# Patient Record
Sex: FEMALE | Race: WHITE | Employment: FULL TIME | URBAN - METROPOLITAN AREA
[De-identification: names, ages, dates, MRNs, and addresses within clinical notes are randomized per-mention and may not be internally consistent; named-entity substitution may affect disease eponyms.]

---

## 2018-04-17 PROBLEM — F51.01 PRIMARY INSOMNIA: Status: ACTIVE | Noted: 2018-04-17

## 2018-04-17 PROBLEM — F33.42 RECURRENT MAJOR DEPRESSIVE DISORDER, IN FULL REMISSION (HCC): Status: ACTIVE | Noted: 2018-04-17

## 2018-08-14 PROBLEM — E03.9 ACQUIRED HYPOTHYROIDISM: Status: ACTIVE | Noted: 2018-08-14

## 2019-06-07 PROBLEM — Z85.828 HISTORY OF BASAL CELL CARCINOMA OF SKIN: Status: ACTIVE | Noted: 2019-06-07

## 2019-06-07 PROBLEM — L56.5 DSAP (DISSEMINATED SUPERFICIAL ACTINIC POROKERATOSIS): Status: ACTIVE | Noted: 2019-06-07

## 2020-01-03 PROBLEM — Z90.5 H/O KIDNEY DONATION: Status: ACTIVE | Noted: 2020-01-03

## 2020-11-11 PROBLEM — J45.20 MILD INTERMITTENT ASTHMA WITHOUT COMPLICATION: Status: ACTIVE | Noted: 2020-11-11

## 2021-05-13 PROBLEM — K43.2 VENTRAL INCISIONAL HERNIA: Status: ACTIVE | Noted: 2021-05-13

## 2021-09-07 ENCOUNTER — HOSPITAL ENCOUNTER (OUTPATIENT)
Dept: PHYSICAL THERAPY | Age: 46
Discharge: HOME | End: 2021-09-07
Attending: SPECIALIST/TECHNOLOGIST

## 2021-09-07 NOTE — PROGRESS NOTES
PT NOTE:    Patient arrived for PT initial evaluation, but left without being seen due to price per visit until meeting deductible.     Adiel Oro, PT, DPT

## 2022-03-18 PROBLEM — Z90.5 H/O KIDNEY DONATION: Status: ACTIVE | Noted: 2020-01-03

## 2022-03-18 PROBLEM — L56.5 DSAP (DISSEMINATED SUPERFICIAL ACTINIC POROKERATOSIS): Status: ACTIVE | Noted: 2019-06-07

## 2022-03-18 PROBLEM — E03.9 ACQUIRED HYPOTHYROIDISM: Status: ACTIVE | Noted: 2018-08-14

## 2022-03-19 PROBLEM — F33.42 RECURRENT MAJOR DEPRESSIVE DISORDER, IN FULL REMISSION (HCC): Status: ACTIVE | Noted: 2018-04-17

## 2022-03-19 PROBLEM — Z85.828 HISTORY OF BASAL CELL CARCINOMA OF SKIN: Status: ACTIVE | Noted: 2019-06-07

## 2022-03-19 PROBLEM — K43.2 VENTRAL INCISIONAL HERNIA: Status: ACTIVE | Noted: 2021-05-13

## 2022-03-19 PROBLEM — J45.20 MILD INTERMITTENT ASTHMA WITHOUT COMPLICATION: Status: ACTIVE | Noted: 2020-11-11

## 2022-03-19 PROBLEM — F51.01 PRIMARY INSOMNIA: Status: ACTIVE | Noted: 2018-04-17

## 2022-03-28 ENCOUNTER — HOSPITAL ENCOUNTER (OUTPATIENT)
Dept: MAMMOGRAPHY | Age: 47
Discharge: HOME | End: 2022-03-28
Payer: COMMERCIAL

## 2022-03-28 DIAGNOSIS — Z01.419 WELL WOMAN EXAM: ICD-10-CM

## 2022-03-28 DIAGNOSIS — R92.2 DENSE BREAST: ICD-10-CM

## 2022-03-28 DIAGNOSIS — Z12.31 ENCOUNTER FOR SCREENING MAMMOGRAM FOR MALIGNANT NEOPLASM OF BREAST: ICD-10-CM

## 2022-03-28 PROCEDURE — 77063 BREAST TOMOSYNTHESIS BI: CPT

## 2022-05-27 RX ORDER — ANTIARTHRITIC COMBINATION NO.2 900 MG
1 TABLET ORAL DAILY
COMMUNITY

## 2022-05-27 NOTE — PERIOP NOTE
Patient verified name and . Order for consent not found in EHR and matches case posting; patient verifies procedure. Type 1B surgery, phone assessment complete. Orders not received. Labs per surgeon: none  Labs per anesthesia protocol: none    Patient answered medical/surgical history questions at their best of ability. All prior to admission medications documented in [facility]. Patient instructed to take the following medications the day of surgery according to anesthesia guidelines with a small sip of water: Albuterol sulftate HFA, use if needed and bring with you, bupropion (Wellbutrin),  Loratadine (Claritin), Acyclovir (Zovirax) if needed, Flonase, Levothyroxine (Synthroid) On the day before surgery please take Acetaminophen 1000mg in the morning and then again before bed. You may substitute for Tylenol 650 mg. Hold all vitamins 7 days prior to surgery and NSAIDS 5 days prior to surgery. Prescription meds to hold:none  Patient instructed on the following:    > Arrive at [facility] OP Entrance, time of arrival to be called the day before by 1700  > NPO after midnight including gum, mints, and ice chips  > Responsible adult must drive patient to the hospital, stay during surgery, and patient will need supervision 24 hours after anesthesia  > Use antibacterial soap  in shower the night before surgery and on the morning of surgery  > All piercings must be removed prior to arrival.    > Leave all valuables (money and jewelry) at home but bring insurance card and ID on DOS.   > You may be required to pay a deductible or co-pay on the day of your procedure. You can pre-pay by calling [phone] if your surgery is at the [facility] or [phone] if your surgery is at the [facility]. > Do not wear make-up, nail polish, lotions, cologne, perfumes, powders, or oil on skin. Artificial nails are not permitted.

## 2022-05-31 ENCOUNTER — ANESTHESIA EVENT (OUTPATIENT)
Dept: SURGERY | Age: 47
End: 2022-05-31
Payer: COMMERCIAL

## 2022-05-31 ENCOUNTER — OFFICE VISIT (OUTPATIENT)
Dept: ORTHOPEDIC SURGERY | Age: 47
End: 2022-05-31

## 2022-05-31 DIAGNOSIS — E03.9 ACQUIRED HYPOTHYROIDISM: Primary | ICD-10-CM

## 2022-05-31 DIAGNOSIS — M25.373 UNSTABLE ANKLE, UNSPECIFIED LATERALITY: Primary | ICD-10-CM

## 2022-05-31 DIAGNOSIS — M25.572 ACUTE LEFT ANKLE PAIN: ICD-10-CM

## 2022-05-31 RX ORDER — DOCUSATE SODIUM 100 MG/1
100 CAPSULE, LIQUID FILLED ORAL DAILY PRN
Qty: 30 CAPSULE | Refills: 0 | Status: SHIPPED | OUTPATIENT
Start: 2022-05-31 | End: 2022-07-08 | Stop reason: ALTCHOICE

## 2022-05-31 RX ORDER — ASPIRIN 81 MG/1
81 TABLET ORAL 2 TIMES DAILY
Qty: 21 TABLET | Refills: 0 | Status: SHIPPED | OUTPATIENT
Start: 2022-05-31 | End: 2022-07-08 | Stop reason: ALTCHOICE

## 2022-05-31 RX ORDER — OXYCODONE HYDROCHLORIDE AND ACETAMINOPHEN 5; 325 MG/1; MG/1
1 TABLET ORAL EVERY 6 HOURS PRN
Qty: 30 TABLET | Refills: 0 | Status: SHIPPED | OUTPATIENT
Start: 2022-05-31 | End: 2022-06-05

## 2022-05-31 RX ORDER — ONDANSETRON 4 MG/1
4 TABLET, ORALLY DISINTEGRATING ORAL EVERY 8 HOURS PRN
Qty: 20 TABLET | Refills: 0 | Status: SHIPPED | OUTPATIENT
Start: 2022-05-31 | End: 2022-07-08 | Stop reason: ALTCHOICE

## 2022-05-31 NOTE — PROGRESS NOTES
Name: Lauri Ross   YOB: 1975   Gender: female   MRN: [id number]       Summary: right ankle sprain early February 2022. Continued pain. MRI shows peroneal tendon tearing and tenosynovitis, and ankle effusion. -Proceed with right lateral ankle reconstruction, peroneal tendon repair and ankle arthroscopy. We discussed today that we may not perform the lateral ankle reconstruction if she is stable on the operating room table. Donated a kidney so cannot take nonsteroidals. Popliteal saphenous block        CC: right ankle pain      HPI: Lauri Ross is a  55 y.o. female Presents today with  right ankle pain that began after they twisted/rolled it 6 weeks ago on a track. This was an inversion ankle injury. They point directly over the lateral ankle into the lateral foot when describing  the pain. They describe pain as deep ache and sharp stabbing pain around the ankle. It is aggravated by standing, at the end of the day, twisting, direct pressure and movement of the ankle. It is alleviated by rest and ice but the pain doesn't completely  leave. They do not have a history of chronic ankle rolling and sprains. She did not do well initially with booting. When I saw her a week transition her into an ASO brace near [facility]. She initially did well but then does develop some increasing pain and what she describes instability. She states her ankle feels it is giving  way and she has pain on the outside of her ankle and some on the inside. We ordered an MRI and she presents back today to discuss the MRI. Based on the MRI we attempted cortisone injection and physical therapy. She still has recurrent ankle instability episodes. Her pain is still limiting her. It did help some but she is not better.   She wants to discuss surgery today as she is thinks  that she has not improved to the point she wants and she still has episodes of ankle rolling and giving way on right ankle on 3/25/2022 at [facility]   Tearing of the peroneal brevis with some straining of the peroneal longus. Injuries of the ATFL and CFL with edema noted. Mild bone contusion of the medial malleolus and posterior talar dome. Ankle effusion noted. Assessment:    right Ankle Sprain   Right peroneal tendon issues      Plan:    4 This is an acute complicated injury. Treatment at this time: Elective major surgery with procedural risk factors    Physical therapy was also ordered today. Weight-bearing status: WBAT in Ankle Brace         Studies ordered: none   Return to work/work restrictions: none   none      Today we discussed surgery. I discussed ankle scope, right ankle reconstruction, peroneal tendon surgery. I discussed surgery with this patient and the risk associated with surgery. These risk include infection, delayed wound healing, hardware failure, painful hardware, recurring wounds, recurring pain, damage to surrounding structures such as nerves, vessels, tendons, ligaments, and joints. I discussed how no surgery is perfect  and this surgery may not be successful. There is also risk of anesthesia such as nerve damage from local anesthetic, damage to the airway or mouth structures, respiratory distress, cardiac disease exacerbation, potential arrhythmias, and even death. The patient verbalized understanding of each of these risk and elected to proceed with surgery. I also discussed chronic nerve issues such as numbness and tingling, retearing, revision surgery, and possibly stiffness associated with the surgery. I discussed how the ankle reconstruction does change postoperative recovery protocols. I also brought for pain along recovery. Her exam does show some mild translation and she does describe instability that is recurrent.   However I want make sure on the operating room table that she truly has increased laxity on stress exam.  Based on intraoperative exam we will decide whether or not she needs a lateral ankle reconstruction or not.   She understands this

## 2022-05-31 NOTE — H&P (VIEW-ONLY)
Name: Juancho Warren   YOB: 1975   Gender: female   MRN: [id number]       Summary: right ankle sprain early February 2022. Continued pain. MRI shows peroneal tendon tearing and tenosynovitis, and ankle effusion. -Proceed with right lateral ankle reconstruction, peroneal tendon repair and ankle arthroscopy. We discussed today that we may not perform the lateral ankle reconstruction if she is stable on the operating room table. Donated a kidney so cannot take nonsteroidals. Popliteal saphenous block        CC: right ankle pain      HPI: Juancho Warren is a  55 y.o. female Presents today with  right ankle pain that began after they twisted/rolled it 6 weeks ago on a track. This was an inversion ankle injury. They point directly over the lateral ankle into the lateral foot when describing  the pain. They describe pain as deep ache and sharp stabbing pain around the ankle. It is aggravated by standing, at the end of the day, twisting, direct pressure and movement of the ankle. It is alleviated by rest and ice but the pain doesn't completely  leave. They do not have a history of chronic ankle rolling and sprains. She did not do well initially with booting. When I saw her a week transition her into an ASO brace near [facility]. She initially did well but then does develop some increasing pain and what she describes instability. She states her ankle feels it is giving  way and she has pain on the outside of her ankle and some on the inside. We ordered an MRI and she presents back today to discuss the MRI. Based on the MRI we attempted cortisone injection and physical therapy. She still has recurrent ankle instability episodes. Her pain is still limiting her. It did help some but she is not better.   She wants to discuss surgery today as she is thinks  that she has not improved to the point she wants and she still has episodes of ankle rolling and giving way on her.    Today is for preop appointment. She still states she has instability episodes. ROS/Meds/PSH/PMH/FH/SH: I personally reviewed the patients standard intake form. Below are the pertinents      Tobacco:  reports that she has never smoked. She has never used smokeless tobacco.   Diabetes: None   Other: none      Physical Examination:   right lower: 2+ DP. Toes wwp x5 w BCR. EHLFHLEDLFDL intact but strength diminished due to ankle pain. Achilles intact. There is some ecchymosis & edema laterally about the ankle.  + SILT ssspdpt  nerves. TTP at the distal fibula and Anterior Lateral ankle, extending into the lateral CC joint region. Today her main pain is located over the peroneal tendons starting the fibula and working down to the cuboid groove. This is not the lateral ankle  ligamentous complex, rather the peroneal tendons themselves. Minimally TTP at the medial ankle. No TTP at the proximal fibula or at the forefoot. Specifically none at the [facility] joint. They are having some mild SPN sensitivity. Regarding her ankles  stability there is some mild laxity to anterior drawer exam      Imaging:    Review of prior x-rays    X-Ray RIGHT Ankle 3 vw (AP/Lateral/Oblique) for ankle pain       Findings: No signs of fracture or dislocations. The tibiotalar and subtalar joints are aligned,  there are no signs of neoplastic disease, or chronic disease or arthritis. The fibula length is maintained with no signs of syndesmosis injury, medial clear space widening, or ligamentous disease. Impression:  Normal Ankle       Signature: Sameer Sidhu MD      and     X-Ray RIGHT Foot 2 vw (AP/Oblique) for foot pain       Findings: No signs of fracture or dislocations. The TMT joints are aligned, there are no signs  of neoplastic disease, or chronic disease.   The midtarsal and subtalar joints appear normal.       Impression:  Normal foot       Signature: Sameer Sidhu MD         MRI of the right ankle on 3/25/2022 at [facility]   Tearing of the peroneal brevis with some straining of the peroneal longus. Injuries of the ATFL and CFL with edema noted. Mild bone contusion of the medial malleolus and posterior talar dome. Ankle effusion noted. Assessment:    right Ankle Sprain   Right peroneal tendon issues      Plan:    4 This is an acute complicated injury. Treatment at this time: Elective major surgery with procedural risk factors    Physical therapy was also ordered today. Weight-bearing status: WBAT in Ankle Brace         Studies ordered: none   Return to work/work restrictions: none   none      Today we discussed surgery. I discussed ankle scope, right ankle reconstruction, peroneal tendon surgery. I discussed surgery with this patient and the risk associated with surgery. These risk include infection, delayed wound healing, hardware failure, painful hardware, recurring wounds, recurring pain, damage to surrounding structures such as nerves, vessels, tendons, ligaments, and joints. I discussed how no surgery is perfect  and this surgery may not be successful. There is also risk of anesthesia such as nerve damage from local anesthetic, damage to the airway or mouth structures, respiratory distress, cardiac disease exacerbation, potential arrhythmias, and even death. The patient verbalized understanding of each of these risk and elected to proceed with surgery. I also discussed chronic nerve issues such as numbness and tingling, retearing, revision surgery, and possibly stiffness associated with the surgery. I discussed how the ankle reconstruction does change postoperative recovery protocols. I also brought for pain along recovery. Her exam does show some mild translation and she does describe instability that is recurrent.   However I want make sure on the operating room table that she truly has increased laxity on stress exam.  Based on intraoperative exam we will decide whether or not she needs a lateral ankle reconstruction or not.   She understands this

## 2022-06-01 ENCOUNTER — ANESTHESIA (OUTPATIENT)
Dept: SURGERY | Age: 47
End: 2022-06-01
Payer: COMMERCIAL

## 2022-06-01 ENCOUNTER — HOSPITAL ENCOUNTER (OUTPATIENT)
Age: 47
Setting detail: OUTPATIENT SURGERY
Discharge: HOME | End: 2022-06-01
Attending: ORTHOPAEDIC SURGERY | Admitting: ORTHOPAEDIC SURGERY
Payer: COMMERCIAL

## 2022-06-01 VITALS
DIASTOLIC BLOOD PRESSURE: 65 MMHG | SYSTOLIC BLOOD PRESSURE: 113 MMHG | HEIGHT: 67 IN | RESPIRATION RATE: 17 BRPM | BODY MASS INDEX: 29.82 KG/M2 | TEMPERATURE: 98 F | HEART RATE: 75 BPM | WEIGHT: 190 LBS | OXYGEN SATURATION: 93 %

## 2022-06-01 PROCEDURE — 6360000002 HC RX W HCPCS: Performed by: STUDENT IN AN ORGANIZED HEALTH CARE EDUCATION/TRAINING PROGRAM

## 2022-06-01 PROCEDURE — 27698 REPAIR OF ANKLE LIGAMENT: CPT | Performed by: ORTHOPAEDIC SURGERY

## 2022-06-01 PROCEDURE — 3600000014 HC SURGERY LEVEL 4 ADDTL 15MIN: Performed by: ORTHOPAEDIC SURGERY

## 2022-06-01 PROCEDURE — 64447 NJX AA&/STRD FEMORAL NRV IMG: CPT | Performed by: STUDENT IN AN ORGANIZED HEALTH CARE EDUCATION/TRAINING PROGRAM

## 2022-06-01 PROCEDURE — 6360000002 HC RX W HCPCS: Performed by: NURSE ANESTHETIST, CERTIFIED REGISTERED

## 2022-06-01 PROCEDURE — 3700000000 HC ANESTHESIA ATTENDED CARE: Performed by: ORTHOPAEDIC SURGERY

## 2022-06-01 PROCEDURE — 6360000002 HC RX W HCPCS: Performed by: ORTHOPAEDIC SURGERY

## 2022-06-01 PROCEDURE — 27680 RELEASE OF LOWER LEG TENDON: CPT | Performed by: ORTHOPAEDIC SURGERY

## 2022-06-01 PROCEDURE — C1713 ANCHOR/SCREW BN/BN,TIS/BN: HCPCS | Performed by: ORTHOPAEDIC SURGERY

## 2022-06-01 PROCEDURE — 7100000011 HC PHASE II RECOVERY - ADDTL 15 MIN: Performed by: ORTHOPAEDIC SURGERY

## 2022-06-01 PROCEDURE — 2500000003 HC RX 250 WO HCPCS: Performed by: NURSE ANESTHETIST, CERTIFIED REGISTERED

## 2022-06-01 PROCEDURE — 7100000010 HC PHASE II RECOVERY - FIRST 15 MIN: Performed by: ORTHOPAEDIC SURGERY

## 2022-06-01 PROCEDURE — 29891 ARTHR ANK OSTCHN DF TAL&/TIB: CPT | Performed by: ORTHOPAEDIC SURGERY

## 2022-06-01 PROCEDURE — 2580000003 HC RX 258: Performed by: STUDENT IN AN ORGANIZED HEALTH CARE EDUCATION/TRAINING PROGRAM

## 2022-06-01 PROCEDURE — 2720000010 HC SURG SUPPLY STERILE: Performed by: ORTHOPAEDIC SURGERY

## 2022-06-01 PROCEDURE — 7100000000 HC PACU RECOVERY - FIRST 15 MIN: Performed by: ORTHOPAEDIC SURGERY

## 2022-06-01 PROCEDURE — 2709999900 HC NON-CHARGEABLE SUPPLY: Performed by: ORTHOPAEDIC SURGERY

## 2022-06-01 PROCEDURE — 64445 NJX AA&/STRD SCIATIC NRV IMG: CPT | Performed by: STUDENT IN AN ORGANIZED HEALTH CARE EDUCATION/TRAINING PROGRAM

## 2022-06-01 PROCEDURE — 3700000001 HC ADD 15 MINUTES (ANESTHESIA): Performed by: ORTHOPAEDIC SURGERY

## 2022-06-01 PROCEDURE — 3600000004 HC SURGERY LEVEL 4 BASE: Performed by: ORTHOPAEDIC SURGERY

## 2022-06-01 PROCEDURE — 7100000001 HC PACU RECOVERY - ADDTL 15 MIN: Performed by: ORTHOPAEDIC SURGERY

## 2022-06-01 DEVICE — 1.8MM Q-FIX ALL SUTURE ANCHOR
Type: IMPLANTABLE DEVICE | Site: ANKLE | Status: FUNCTIONAL
Brand: Q-FIX

## 2022-06-01 RX ORDER — OXYCODONE HYDROCHLORIDE 5 MG/1
10 TABLET ORAL PRN
Status: DISCONTINUED | OUTPATIENT
Start: 2022-06-01 | End: 2022-06-01 | Stop reason: HOSPADM

## 2022-06-01 RX ORDER — FENTANYL CITRATE 50 UG/ML
100 INJECTION, SOLUTION INTRAMUSCULAR; INTRAVENOUS
Status: COMPLETED | OUTPATIENT
Start: 2022-06-01 | End: 2022-06-01

## 2022-06-01 RX ORDER — SODIUM CHLORIDE 9 MG/ML
INJECTION, SOLUTION INTRAVENOUS PRN
Status: DISCONTINUED | OUTPATIENT
Start: 2022-06-01 | End: 2022-06-01 | Stop reason: HOSPADM

## 2022-06-01 RX ORDER — PROPOFOL 10 MG/ML
INJECTION, EMULSION INTRAVENOUS PRN
Status: DISCONTINUED | OUTPATIENT
Start: 2022-06-01 | End: 2022-06-01 | Stop reason: SDUPTHER

## 2022-06-01 RX ORDER — DIPHENHYDRAMINE HYDROCHLORIDE 50 MG/ML
12.5 INJECTION INTRAMUSCULAR; INTRAVENOUS
Status: DISCONTINUED | OUTPATIENT
Start: 2022-06-01 | End: 2022-06-01 | Stop reason: HOSPADM

## 2022-06-01 RX ORDER — OXYCODONE HYDROCHLORIDE 5 MG/1
5 TABLET ORAL PRN
Status: DISCONTINUED | OUTPATIENT
Start: 2022-06-01 | End: 2022-06-01 | Stop reason: HOSPADM

## 2022-06-01 RX ORDER — ROPIVACAINE HYDROCHLORIDE 5 MG/ML
INJECTION, SOLUTION EPIDURAL; INFILTRATION; PERINEURAL
Status: COMPLETED | OUTPATIENT
Start: 2022-06-01 | End: 2022-06-01

## 2022-06-01 RX ORDER — MIDAZOLAM HYDROCHLORIDE 2 MG/2ML
2 INJECTION, SOLUTION INTRAMUSCULAR; INTRAVENOUS
Status: COMPLETED | OUTPATIENT
Start: 2022-06-01 | End: 2022-06-01

## 2022-06-01 RX ORDER — LABETALOL HYDROCHLORIDE 5 MG/ML
10 INJECTION, SOLUTION INTRAVENOUS
Status: DISCONTINUED | OUTPATIENT
Start: 2022-06-01 | End: 2022-06-01 | Stop reason: HOSPADM

## 2022-06-01 RX ORDER — ONDANSETRON 2 MG/ML
INJECTION INTRAMUSCULAR; INTRAVENOUS PRN
Status: DISCONTINUED | OUTPATIENT
Start: 2022-06-01 | End: 2022-06-01 | Stop reason: SDUPTHER

## 2022-06-01 RX ORDER — HYDROMORPHONE HYDROCHLORIDE 2 MG/ML
0.5 INJECTION, SOLUTION INTRAMUSCULAR; INTRAVENOUS; SUBCUTANEOUS EVERY 5 MIN PRN
Status: DISCONTINUED | OUTPATIENT
Start: 2022-06-01 | End: 2022-06-01 | Stop reason: HOSPADM

## 2022-06-01 RX ORDER — HALOPERIDOL 5 MG/ML
1 INJECTION INTRAMUSCULAR
Status: DISCONTINUED | OUTPATIENT
Start: 2022-06-01 | End: 2022-06-01 | Stop reason: HOSPADM

## 2022-06-01 RX ORDER — HYDRALAZINE HYDROCHLORIDE 20 MG/ML
10 INJECTION INTRAMUSCULAR; INTRAVENOUS
Status: DISCONTINUED | OUTPATIENT
Start: 2022-06-01 | End: 2022-06-01 | Stop reason: HOSPADM

## 2022-06-01 RX ORDER — LIDOCAINE HYDROCHLORIDE 20 MG/ML
INJECTION, SOLUTION EPIDURAL; INFILTRATION; INTRACAUDAL; PERINEURAL PRN
Status: DISCONTINUED | OUTPATIENT
Start: 2022-06-01 | End: 2022-06-01 | Stop reason: SDUPTHER

## 2022-06-01 RX ORDER — SODIUM CHLORIDE 0.9 % (FLUSH) 0.9 %
5-40 SYRINGE (ML) INJECTION EVERY 12 HOURS SCHEDULED
Status: DISCONTINUED | OUTPATIENT
Start: 2022-06-01 | End: 2022-06-01 | Stop reason: HOSPADM

## 2022-06-01 RX ORDER — PROPOFOL 10 MG/ML
INJECTION, EMULSION INTRAVENOUS CONTINUOUS PRN
Status: DISCONTINUED | OUTPATIENT
Start: 2022-06-01 | End: 2022-06-01 | Stop reason: SDUPTHER

## 2022-06-01 RX ORDER — SODIUM CHLORIDE, SODIUM LACTATE, POTASSIUM CHLORIDE, CALCIUM CHLORIDE 600; 310; 30; 20 MG/100ML; MG/100ML; MG/100ML; MG/100ML
INJECTION, SOLUTION INTRAVENOUS CONTINUOUS
Status: DISCONTINUED | OUTPATIENT
Start: 2022-06-01 | End: 2022-06-01 | Stop reason: HOSPADM

## 2022-06-01 RX ORDER — DEXAMETHASONE SODIUM PHOSPHATE 4 MG/ML
INJECTION, SOLUTION INTRA-ARTICULAR; INTRALESIONAL; INTRAMUSCULAR; INTRAVENOUS; SOFT TISSUE PRN
Status: DISCONTINUED | OUTPATIENT
Start: 2022-06-01 | End: 2022-06-01 | Stop reason: SDUPTHER

## 2022-06-01 RX ORDER — FENTANYL CITRATE 50 UG/ML
25 INJECTION, SOLUTION INTRAMUSCULAR; INTRAVENOUS
Status: COMPLETED | OUTPATIENT
Start: 2022-06-01 | End: 2022-06-01

## 2022-06-01 RX ORDER — PROCHLORPERAZINE EDISYLATE 5 MG/ML
5 INJECTION INTRAMUSCULAR; INTRAVENOUS
Status: DISCONTINUED | OUTPATIENT
Start: 2022-06-01 | End: 2022-06-01 | Stop reason: HOSPADM

## 2022-06-01 RX ORDER — IPRATROPIUM BROMIDE AND ALBUTEROL SULFATE 2.5; .5 MG/3ML; MG/3ML
1 SOLUTION RESPIRATORY (INHALATION)
Status: DISCONTINUED | OUTPATIENT
Start: 2022-06-01 | End: 2022-06-01 | Stop reason: HOSPADM

## 2022-06-01 RX ORDER — SODIUM CHLORIDE 0.9 % (FLUSH) 0.9 %
5-40 SYRINGE (ML) INJECTION PRN
Status: DISCONTINUED | OUTPATIENT
Start: 2022-06-01 | End: 2022-06-01 | Stop reason: HOSPADM

## 2022-06-01 RX ADMIN — Medication 2 G: at 13:10

## 2022-06-01 RX ADMIN — ONDANSETRON 4 MG: 2 INJECTION INTRAMUSCULAR; INTRAVENOUS at 13:31

## 2022-06-01 RX ADMIN — LIDOCAINE HYDROCHLORIDE 40 MG: 20 INJECTION, SOLUTION EPIDURAL; INFILTRATION; INTRACAUDAL; PERINEURAL at 13:12

## 2022-06-01 RX ADMIN — PROPOFOL 20 MG: 10 INJECTION, EMULSION INTRAVENOUS at 13:12

## 2022-06-01 RX ADMIN — MIDAZOLAM HYDROCHLORIDE 2 MG: 1 INJECTION, SOLUTION INTRAMUSCULAR; INTRAVENOUS at 12:13

## 2022-06-01 RX ADMIN — ROPIVACAINE HYDROCHLORIDE 20 ML: 5 INJECTION, SOLUTION EPIDURAL; INFILTRATION; PERINEURAL at 12:13

## 2022-06-01 RX ADMIN — SODIUM CHLORIDE, SODIUM LACTATE, POTASSIUM CHLORIDE, AND CALCIUM CHLORIDE: 600; 310; 30; 20 INJECTION, SOLUTION INTRAVENOUS at 10:15

## 2022-06-01 RX ADMIN — ROPIVACAINE HYDROCHLORIDE 20 ML: 5 INJECTION, SOLUTION EPIDURAL; INFILTRATION; PERINEURAL at 12:18

## 2022-06-01 RX ADMIN — FENTANYL CITRATE 100 MCG: 50 INJECTION, SOLUTION INTRAMUSCULAR; INTRAVENOUS at 12:13

## 2022-06-01 RX ADMIN — DEXAMETHASONE SODIUM PHOSPHATE 4 MG: 4 INJECTION, SOLUTION INTRAMUSCULAR; INTRAVENOUS at 13:31

## 2022-06-01 RX ADMIN — PROPOFOL 160 MCG/KG/MIN: 10 INJECTION, EMULSION INTRAVENOUS at 13:13

## 2022-06-01 ASSESSMENT — PAIN SCALES - GENERAL
PAINLEVEL_OUTOF10: 0

## 2022-06-01 ASSESSMENT — PAIN - FUNCTIONAL ASSESSMENT: PAIN_FUNCTIONAL_ASSESSMENT: 0-10

## 2022-06-01 NOTE — ANESTHESIA POSTPROCEDURE EVALUATION
Department of Anesthesiology  Postprocedure Note    Patient: Shakeel Cain  MRN: [id number]  YOB: 1975  Date of evaluation: 6/1/2022  Time:  4:54 PM     Procedure Summary     Date: 06/01/22 Room / Location: [facility] OP OR 02 / [facility]    Anesthesia Start: 1305 Anesthesia Stop: 1448    Procedure: RIGHT ANKLE ARTHROSCOPY WITH RECONSTRUCTION (Right Ankle) Diagnosis:       Instability of right ankle joint      (Instability of right ankle joint [M25.371])    Surgeons: Alcon Gramajo MD Responsible Provider: Hilary Santillan MD    Anesthesia Type: TIVA ASA Status: 2          Anesthesia Type: No value filed. Celine Phase I: Celine Score: 8    Celine Phase II: Celine Score: 10    Last vitals: Reviewed and per EMR flowsheets.        Anesthesia Post Evaluation    Patient location during evaluation: bedside  Patient participation: complete - patient participated  Level of consciousness: awake and alert  Pain score: 1  Airway patency: patent  Nausea & Vomiting: no vomiting  Complications: no  Cardiovascular status: hemodynamically stable  Respiratory status: acceptable  Hydration status: euvolemic

## 2022-06-01 NOTE — INTERVAL H&P NOTE
Update History & Physical    The Patient's History and Physical was reviewed   I discussed the surgery and patients medical condition with the patient. The chart was reviewed with the patient and I examined the patient. There was no change. The surgical site was confirmed by the patient and me. CV: RRR  RESP: CTAB    Plan:  The risk, benefits, expected outcome, and alternative to the recommended procedure have been discussed with the patient. Patient understands and wants to proceed with the procedure.     Electronically signed by Ajay Corley MD on 06/01/22 11:02 AM

## 2022-06-01 NOTE — ANESTHESIA PROCEDURE NOTES
Peripheral Block    Patient location during procedure: pre-op  Start time: 6/1/2022 12:13 PM  End time: 6/1/2022 12:17 PM  Staffing  Performed: anesthesiologist   Anesthesiologist: Aamir Mark MD  Preanesthetic Checklist  Completed: patient identified, IV checked, site marked, risks and benefits discussed, surgical/procedural consents, equipment checked, pre-op evaluation, timeout performed, anesthesia consent given, oxygen available and monitors applied/VS acknowledged  Peripheral Block  Patient position: supine  Prep: ChloraPrep  Patient monitoring: cardiac monitor, continuous pulse ox, frequent blood pressure checks, IV access, oxygen and responsive to questions  Block type: Sciatic  Laterality: right  Injection technique: single-shot  Guidance: ultrasound guided  Popliteal  Provider prep: mask  Needle  Needle type: insulated echogenic nerve stimulator needle   Needle gauge: 20 G  Needle localization: ultrasound guidance  Assessment  Injection assessment: negative aspiration for heme, no paresthesia on injection, local visualized surrounding nerve on ultrasound and no intravascular symptoms  Slow fractionated injection: yes  Hemodynamics: stablepermanent images obtainedOutcomes: patient tolerated procedure well  Medications Administered  Ropivacaine (NAROPIN) injection 0.5%, 20 mL  Reason for block: procedure for pain, post-op pain management and at surgeon's request

## 2022-06-01 NOTE — OP NOTE
Operative Note    Patient:Arielle Rothman  MRN: [id number]    Date Of Surgery: 6/1/2022    Surgeon: Adán Howell MD    Assistant Surgeon: None    Procedure Performed:   right  Lateral ankle reconstruction - ATFL & CFL  Ankle arthroscopy for osteochondral lesion of the talus  Peroneal tendon tenosynovectomy    Pre Op Diagnosis:  Ankle instability  Chondral damage of the talus  Peroneal tendinitis    Post Op Diagnosis:   same    Implants:   Implant Name Type Inv. Item Serial No.  Lot No. LRB No. Used Action   ANCHOR SUT DIA1. 8MM FOR ACET LABRAL REP Q-FIX - [id number]  ANCHOR SUT DIA1. 8MM FOR ACET LABRAL REP Q-FIX  [facility] AND [employer] [place] [id number] Right 2 Implanted       Anesthesia:  Monitor Anesthesia Care    Blood Loss:  -    Tourniquet:  Estimated calf @250 mmHg mmHg -45 minutes    Complications:  -    Pre Operative Abx:   Ancef 2g    Specimens/Cultures:  -    Significant Findings:  1-peroneal tendinitis but no tears of the longus or brevis noted. There was some thinning of the brevis but no tearing  2-ankle instability with positive anterior lateral drawer exam  3-large chondral fissuring off the posterior lateral talus. Note fragments of the chondral pieces were not loose, but this cartilage shelf did extend over the talar body. I did not find it appropriate to debride for this would have not encompass approximately the entire lateral edge of the talus. Pre Operative Course:  Charla Hart is a 55 y.o. female who sustained an acute injury approximately 4 to 5 months ago. She continued to have pain despite nonoperative treatment. MRI was obtained that showed ankle effusion, potential osteochondral lesion, peroneal tearing and my exam was consistent with ligamentous laxity. She continued to have ankle instability. Operation In Detail:  Patient was evaluated in the preoperative area. The right lower extremity was marked by me.   We had a long discussion about the procedure and postoperative protocols. The patient was then brought back to the operating room suite and placed in the operating room table. A timeout was taken to identify the patient, procedure being performed, and laterality. After this the patient was prepped and draped in the normal sterile fashion using a Betadine solution and/or a ChloraPrep solution. A timeout was then taken to identify the patient his name, date of birth, laterality, and procedure being performed. We also identified allergies and any concerns about the operation. Attention was then placed to the operative extremity. No ankle distractor was used for this procedure. I started to perform an anterior lateral ankle exam.  I felt the ankle did translate abnormally in the anterior drawer test compared to the contralateral side. Next I identified the anterior tibial tendon. At the level of the ankle joint, just medial to the anterior tibial tendon, I inserted a a 18-gauge needle and injected 10 cc of normal saline. Then using a 15 blade knife and made a small incision over the skin only. Using the nick and spread technique I then grabbed a hemostat and bluntly dissected down to the joint capsule. I then penetrated the capsule with the hemostat and a gush of fluid was identified. I then inserted the blunt tipped trochar into the joint, followed by the scope. I inspected the joint but felt there was too much hazy material so I next made a lateral portal.  While visualizing with the scope within the ankle joint, I inserted an 18 gauge spinal needle in the anterior lateral ankle. This was done by transillumination technique to avoid neural or vascular structures. I did not identify any vascular sutures. I watched this needle enter the joint at the level of the joint and decided that it was placed well. I then inspected the skin around the 18 gauge needle. A branch of the SPN nerve was identified just lateral to needle.    I made a small nick in the skin with a 15 blade knife medial to the identified SPN branch. Again using the nick and spread technique, dissected down to the joint capsule with a hemostat. As I inserted the hemostat and the capsule I visualized it on arthroscopic cameras. A rush of fluid was seen out of this hole at the skin, and I made sure not to damage any of the chondral surface. Through this portal I inserted a probe. There was a couple plica's and some scar tissue noted in the anterior aspect of the ankle extending into the back of the ankle. I debrided these with a shaver. Medially there is no signs of chondral damage. No signs of tibiotalar damage. There is a couple areas of hypertrophic synovium which I debrided. No signs of loose bodies in the posterior ankle. The anterior ankle and anterior lateral ankle appeared normal.  However I detected the anterior middle in the anterior posterior shoulder the lateral talar dome there is seem to be some loss of normal chondral contour. I stuck my scope into the lateral gutter. There is a significant chondral fissure in this lateral gutter at the talus. This chondral fissure was full-thickness and there was a little loose segment. It spanned approximately the posterior 40% of the talus and extended over the talar dome. I used a [place] and shave down some of the loose edges. However I did not completely do remove this as it was attached to bone underneath it. I did not think had a capability of truly fixing a chondral lesion of this size. Therefore the decision was made to leave the chondral lesion as it is just from some of the loose edges at this large chondral area. During this evaluation of the chondral damage also performed an anterior lateral drawer exam and anterior drawer exam.  I felt the ankle translated slightly too much and close noted to be loose more loose on the lateral side that it was the medial side.   I then withdrew the arthroscopic utensils and removed the fluid from the joint. The operative leg was identified. Using a 15 blade I cute through the skin on the posterior 1/3 of the fibula, extending distally over the course of the peroneal tendons. I sharply dissected down to the level of the lateral ankle ligaments and the peroneal tendon sheath. I incised the peroneal tendon sheath behind the distal fibula and inspected both tendons. The Peroneal Longus tendon was first identified and it had 0 tears. . There was a fair amount of scar tissue and inflamed tenosynovium around it. Using the scissors and a knife I removed the inflamed tenosynovium and performed a tenosynovectomy    The Peroneal Brevis tendon was first identified and it had none tears. There was some thinning and a little full of the tendon however this was not a tear. I did trim some of the loose edges elevated the fraying. There was a low-lying muscle belly which I removed with scissors and a knife, but there was very minimal inflamed tenosynovium around it. I then protected both peroneal tendons with a retractor. I took a 15 blade knife and sharply dissected the Lateral ankle ligaments (ATFL and CFL) off the fibula in one full thickness cuff. I did this by subperiosteally dissecting them off the anterior inferior aspect of the fibula. I made sure I was able to see the lateral talar dome to make sure I got a full thickness cuff. I then took off the soft tissue from the distal fibula anteriorly and inferiorly. This is were the ATFL and CFL insert. I also took a rongeur and made a small trough through the cortical bone in this same area. This exposed cancellous bone. I next identified where the ATFL and CFL both wound insert and drilled a hole in the distal fibula for each. I then inserted the 1Q anchor [employer] anchor into each anatomic footprint for the ATFL and CFL. I attached a free needle to each suture. Each [place] had 1 sutures.   Each end of the suture had a needle. I then Identified the CFL in the inferior tissue cuff. I then passed a suture from the \"CFL fibular insertion\" anchor through the CFL in a grasping fashion. I then passed a second suture through the CFL area, but this was not grasping. This allowed me to pull the sliding suture and cinch the CFL back to its insertion. I then Identified the ATFL in the anterior tissue cuff. I then passed a suture from the \"ATFL fibular insertion\" anchor through the mid substance ATFL in a grasping fashion. I then passed a second suture through the midsubstance ATFL area, but this was not grasping. This allowed me to pull the sliding suture and cinch the ATFL back to its insertion. Each of these sutures allowed me to tighten the lateral ligamentous ankle complex. Next I irrigated out the ankle joint with sterile saline. I had an assistant hold posterior pressure on the ankle to ensure the ankle was translating anteriorly. I also had an assistant hold mild eversion of the ankle. I next cinched the CFL sutures and ligament back to the fibula. I tied 4 square knots. I repeated this step with my assistant still holding the posterior pressure and eversion. I cinched the ATFL sutures and ligament back to the fibula. I tied 4 square knots. Then took a Vicryl 2 oh and reinforced the repair by sewing the cuff of the lateral ankle ligamentous complex to the periosteum and lateral soft tissues. This provided an oversew of the ATFL and CFL repair to create a more repair. I then performed the anterior drawer test.  I felt it was stable. I then did a talar tilt test and I felt the ankle was stable without excessive tilt. I cut the tails off the sutures. I irrigated out the peroneal tendons and then repaired the peroneal tenon sheath with a vicryl suture. I irrigated out the wound with sterile saline-betadine solution, closed the skin with 3-0 monocryl sub q.  I then closed the top layer of skin with an interrupted 3 oh vertical mattress nylon for the skin. A sterile dressing was then applied to the leg and Posterior slab splint with Stirrups. They were awoken from anesthesia and returned to the PACU without difficulty.     Post Operative Plan:   1- WB status: Non-Weight Bearing   2- Follow Up: 2-3 weeks  3- Immobilization/assistive devices: brace/splint  4- DVT px: ASA 81 mg BID  5- Pain Medication:

## 2022-06-01 NOTE — ANESTHESIA PROCEDURE NOTES
Peripheral Block    Patient location during procedure: pre-op  Start time: 6/1/2022 12:18 PM  End time: 6/1/2022 12:20 PM  Staffing  Performed: anesthesiologist   Anesthesiologist: Dale Schlatter, MD  Preanesthetic Checklist  Completed: patient identified, IV checked, site marked, risks and benefits discussed, surgical/procedural consents, equipment checked, pre-op evaluation, timeout performed, anesthesia consent given, oxygen available and monitors applied/VS acknowledged  Peripheral Block  Patient position: supine  Prep: ChloraPrep  Patient monitoring: cardiac monitor, continuous pulse ox, frequent blood pressure checks, IV access, oxygen and responsive to questions  Block type: Femoral  Laterality: right  Injection technique: single-shot  Guidance: ultrasound guided  Adductor canal  Provider prep: mask  Needle  Needle type: insulated echogenic nerve stimulator needle   Needle gauge: 20 G  Needle localization: ultrasound guidance  Assessment  Injection assessment: negative aspiration for heme, no paresthesia on injection, local visualized surrounding nerve on ultrasound and no intravascular symptoms  Paresthesia pain: none  Slow fractionated injection: yes  Hemodynamics: stablepermanent images obtainedOutcomes: patient tolerated procedure well  Medications Administered  Ropivacaine (NAROPIN) injection 0.5%, 20 mL  Reason for block: procedure for pain, post-op pain management and at surgeon's request

## 2022-06-01 NOTE — ANESTHESIA PRE PROCEDURE
Department of Anesthesiology  Preprocedure Note       Name:  Alfonso Butler   Age:  55 y.o.  :  1975                                          MRN:  [id number]         Date:  2022      Surgeon: Yesika Payne):  Alex Dobbins MD    Procedure: Procedure(s):  RIGHT ANKLE ARTHROSCOPY WITH RECONSTRUCTION/SUPINE POP SPAH BLOCK [facility]    Medications prior to admission:   Prior to Admission medications    Medication Sig Start Date End Date Taking? Authorizing Provider   Multiple Vitamin (MULTIVITAMIN ADULT PO) Take by mouth nightly   Yes Historical Provider, MD   URINARY HEALTH/CRANBERRY PO Take 1 tablet by mouth daily   Yes Historical Provider, MD   Biotin 5000 MCG TABS Take 1 tablet by mouth daily   Yes Historical Provider, MD   aspirin (ASPIRIN 81) 81 MG EC tablet Take 1 tablet by mouth in the morning and at bedtime  Patient not taking: Reported on 2022   Alex Dobbins MD   docusate sodium (COLACE) 100 mg capsule Take 1 capsule by mouth daily as needed for Constipation  Patient not taking: Reported on 2022   Alex Dobbins MD   ondansetron (ZOFRAN ODT) 4 MG disintegrating tablet Take 1 tablet by mouth every 8 hours as needed for Nausea or Vomiting  Patient not taking: Reported on 2022   Alex Dobbins MD   oxyCODONE-acetaminophen (PERCOCET) 5-325 MG per tablet Take 1 tablet by mouth every 6 hours as needed for Pain for up to 5 days. Intended supply: 5 days.  Take lowest dose possible to manage pain  Patient not taking: Reported on 2022  Alex Dobbins MD   acyclovir (ZOVIRAX) 800 MG tablet as needed TAKE 1 TABLET BY MOUTH TWICE A DAY FOR 10 DAYS 21   Ar Automatic Reconciliation   albuterol sulfate  (90 Base) MCG/ACT inhaler Inhale 1 puff into the lungs every 4 hours as needed 21   Ar Automatic Reconciliation   buPROPion (WELLBUTRIN XL) 150 MG extended release tablet Take 150 mg by mouth every morning  21   Ar Automatic Reconciliation   Cetirizine HCl (ZYRTEC ALLERGY) 10 MG CAPS Take 10 mg by mouth nightly     Ar Automatic Reconciliation   vitamin D (CHOLECALCIFEROL) 25 MCG (1000 UT) TABS tablet Take by mouth daily    Ar Automatic Reconciliation   fluticasone (FLONASE) 50 MCG/ACT nasal spray 2 sprays by Nasal route daily    Ar Automatic Reconciliation   gabapentin (NEURONTIN) 100 MG capsule Take 100 mg by mouth nightly. 8/25/21   Ar Automatic Reconciliation   levonorgestrel (MIRENA, 52 MG,) IUD 52 mg 1 Device by IntraUTERine route once 2/25/19 2/25/24  Ar Automatic Reconciliation   levothyroxine (SYNTHROID) 150 MCG tablet TAKE 1 TABLET BY MOUTH EVERY DAY BEFORE BREAKFAST 4/20/21   Ar Automatic Reconciliation   loratadine (CLARITIN) 10 MG tablet Take 10 mg by mouth daily     Ar Automatic Reconciliation   melatonin 5 MG TABS tablet Take 10 mg by mouth nightly     Ar Automatic Reconciliation   sertraline (ZOLOFT) 100 MG tablet Take 100 mg by mouth nightly  4/20/21   Ar Automatic Reconciliation   traZODone (DESYREL) 150 MG tablet Take 150 mg by mouth nightly Takes 1\3 pill at night 50 mg 8/25/21   Ar Automatic Reconciliation       Current medications:    Current Facility-Administered Medications   Medication Dose Route Frequency Provider Last Rate Last Admin    lidocaine 1 % injection 1 mL  1 mL IntraDERmal Once PRN Dale Schlatter, MD        ceFAZolin (ANCEF) 2000 mg in sterile water 20 mL IV syringe  2,000 mg IntraVENous On Call to [facility], MD        sodium chloride flush 0.9 % injection 5-40 mL  5-40 mL IntraVENous 2 times per day Jhonny Simpson MD        sodium chloride flush 0.9 % injection 5-40 mL  5-40 mL IntraVENous PRN Jhonny Simpson MD        0.9 % sodium chloride infusion   IntraVENous PRN Jhonny Simpson MD        lactated ringers infusion   IntraVENous Continuous Dale Schlatter,  mL/hr at 06/01/22 1015 New Bag at 06/01/22 1015       Allergies:     Allergies   Allergen Reactions    Flurbiprofen Other (See Comments)     donated kidney    Red Dye Hives     #40       Problem List:    Patient Active Problem List   Diagnosis Code    Acquired hypothyroidism E03.9    H/O kidney donation Z90.5    DSAP (disseminated superficial actinic porokeratosis) L56.5    Mild intermittent asthma without complication L14.48    History of basal cell carcinoma of skin Z85.828    Allergic rhinitis due to pollen J30.1    Recurrent major depressive disorder, in full remission ([facility] [address] F33.42    Ventral incisional hernia K43.2    Multinodular goiter E04.2    DDD (degenerative disc disease), lumbar M51.36    Primary insomnia F51.01       Past Medical History:        Diagnosis Date    Allergic rhinitis due to pollen     Arthritis     knees  (fracture)    Cancer ([facility] [address]     basal cell skin cancer chest and forehead     DDD (degenerative disc disease), lumbar     Depressed     History of kidney donation     Hypothyroidism due to acquired atrophy of thyroid 2015    Thyroid disease     hypo- on meds       Past Surgical History:        Procedure Laterality Date     SECTION      x 3    GYN      C/S x 3    HEENT  2014    total thryoidectomy    HERNIA REPAIR      KNEE ARTHROSCOPY Bilateral 4/20/15    both cleaned out    ORTHOPEDIC SURGERY Left     knee scope    UROLOGICAL SURGERY  2016    donation kidney left       Social History:    Social History     Tobacco Use    Smoking status: Never Smoker    Smokeless tobacco: Never Used   Substance Use Topics    Alcohol use: Yes     Comment: ocassional                                Counseling given: Not Answered      Vital Signs (Current):   Vitals:    22 1005 22 1220 22 1225 22 1230   BP: 120/67 112/63 124/65 119/63   Pulse: 71 73 70 76   Resp: 18 17 16 16   Temp: 97.7 °F (36.5 °C)      TempSrc: Oral      SpO2: 97% 99% 99% 98%   Weight: 190 lb (86.2 kg)      Height:                                                  BP Readings from Last 3 Encounters:   06/01/22 119/63   12/09/21 110/70   08/25/21 110/78       NPO Status: Time of last liquid consumption: 2100                        Time of last solid consumption: 2100                        Date of last liquid consumption: 05/31/22                        Date of last solid food consumption: 05/31/22    BMI:   Wt Readings from Last 3 Encounters:   06/01/22 190 lb (86.2 kg)   12/09/21 194 lb 6.4 oz (88.2 kg)   08/27/21 190 lb (86.2 kg)     Body mass index is 29.76 kg/m². CBC:   Lab Results   Component Value Date    WBC 3.6 04/20/2021    RBC 4.51 04/20/2021    HGB 14.0 04/20/2021    HCT 41.7 04/20/2021    MCV 93 04/20/2021    RDW 13.3 04/20/2021     04/20/2021       CMP:   Lab Results   Component Value Date     04/20/2021    K 4.5 04/20/2021     04/20/2021    CO2 23 04/20/2021    BUN 11 04/20/2021    CREATININE 0.81 04/20/2021    GFRAA 101 04/20/2021    AGRATIO 1.7 04/20/2021    GLUCOSE 87 04/20/2021    PROT 6.5 04/20/2021    CALCIUM 9.1 04/20/2021    BILITOT 0.3 04/20/2021    ALKPHOS 87 04/20/2021    AST 16 04/20/2021    ALT 23 04/20/2021       POC Tests: No results for input(s): POCGLU, POCNA, POCK, POCCL, POCBUN, POCHEMO, POCHCT in the last 72 hours.     Coags: No results found for: PROTIME, INR, APTT    HCG (If Applicable): No results found for: PREGTESTUR, PREGSERUM, HCG, HCGQUANT     ABGs: No results found for: PHART, PO2ART, TBJ5OIR, YXA5ROF, BEART, Z2ZWIVYA     Type & Screen (If Applicable):  No results found for: LABABO, LABRH    Drug/Infectious Status (If Applicable):  No results found for: HIV, HEPCAB    COVID-19 Screening (If Applicable): No results found for: COVID19        Anesthesia Evaluation  Patient summary reviewed and Nursing notes reviewed no history of anesthetic complications:   Airway: Mallampati: II  TM distance: >3 FB   Neck ROM: full  Mouth opening: > = 3 FB   Dental: normal exam         Pulmonary:normal exam    (+) asthma: Cardiovascular:  Exercise tolerance: good (>4 METS),                     Neuro/Psych:   (+) psychiatric history:            GI/Hepatic/Renal:            ROS comment: Single kidney. Endo/Other:    (+) hypothyroidism::., .                 Abdominal:             Vascular: Other Findings:           Anesthesia Plan      TIVA     ASA 2       Induction: intravenous. Anesthetic plan and risks discussed with patient and spouse.               Post-op pain plan if not by surgeon: single peripheral nerve block            Nathen Seals MD   6/1/2022

## 2022-06-28 ENCOUNTER — OFFICE VISIT (OUTPATIENT)
Dept: ORTHOPEDIC SURGERY | Age: 47
End: 2022-06-28
Payer: COMMERCIAL

## 2022-06-28 DIAGNOSIS — M25.572 ACUTE LEFT ANKLE PAIN: Primary | ICD-10-CM

## 2022-06-28 PROCEDURE — L4360 PNEUMAT WALKING BOOT PRE CST: HCPCS | Performed by: ORTHOPAEDIC SURGERY

## 2022-06-28 PROCEDURE — 99024 POSTOP FOLLOW-UP VISIT: CPT | Performed by: ORTHOPAEDIC SURGERY

## 2022-06-28 NOTE — PROGRESS NOTES
Name: Lena Galicia  YOB: 1975  Gender: female  MRN: [id number]      Procedure: Right Ankle Arthroscopy With Reconstruction - Right    Surgery Date: 6/1/2022    Subjective: Denies fevers chills or infection. Denies any signs of spreading erythema. Exam: Wound healing appropriately. No signs of dehiscence or infection. No signs of erythema or drainage. Neurovascularly intact of the foot. With toes warm and well-perfused. Imaging:   No imaging reviewed      Assessment/Plan:    Weight-bear as tolerated in cam boot. Transition to ASO in 4 weeks. Begin physical therapy in 2 to 3 weeks. Studies ordered: NO XR needed @ Next Visit    Weight-bearing status: WBAT in Boot/hardsole shoe          Return to work/work restrictions: none  none    Follow up No follow-up provider specified.

## 2022-06-28 NOTE — LETTER
DME Patient Authorization Form    Name: Serge Hendricks  : 1975  MRN: [id number]   Age: 55 y.o. Gender: female  Delivery Address: [facility]     Diagnosis:     ICD-10-CM    1. Acute left ankle pain  M25.572 Nalle[address] Ruiz ()        Requested DME:  Abdulkadir teixeira        Clinical Notes:     **Indicates non-covered items by insurance. Payment expected on date of service. Electronically signed by  Provider: _______________________________ Date: 2022                             [facility]/[address] Tax ID # [id number]        Durable Medical Equipment and/or Orthotics Patient Consent     I understand that my physician has prescribed this medical supply as part of my treatment plan as a matter of Medical Necessity.  I understand that I have a choice in where I receive my prescribed orthopedic supplies and/or services.  I authorize [facility] to furnish this service/product and to provide my insurance carrier with any information requested in order to process for payment.  I instruct my insurance carrier to pay [facility] directly for these services/products.  I understand that my insurance carrier may deny payment for this supply because it is a non-covered item, deemed not medically necessary or considered experimental.   I understand that any cost not covered by my insurance carrier will be solely my financial responsibility.  I have received the Supplier Standards and have reviewed them.  I have received the prescribed item and have been fully instructed on the proper use of the above services/products.    ______ (Patient Initials) I understand that all DME items are non-returnable after being dispensed. Items still in sealed packaging may be returned up to 14 days after purchasing.  [address] will replace items that are defective.    ______ (Patient Initials) I understand that Eloy Segura will not file a claim with my insurance carrier for this service/product and I am waiving my right to file a claim on my own for this service/product with my insurance company as this item is NON-COVERED (Denoted by the **) by my Insurance company/policy. ______ (Patient Initials) I understand that I am responsible to bring my equipment to the hospital for any surgery. ______________________________________________  ________________________  Patient / Humberto Ramírez            Thank you for considering [address] Your physician has prescribed specific medical equipment or devices for your home use. The following describes your rights and responsibilities as our customer. Right to Choose Providers: You have a choice regarding which company supplies your home medical equipment and devices, and to consult your physician in this decision. You may choose a medical supply store, a home medical equipment provider, or a specialist such as [facility]. [facility] will coordinate with your physician to provide the medical equipment or devices prescribed for your home use. Right to Service:  You have the right to considerate, respectful and nondiscriminatory care. You have the right to receive accurate and easily understood information about your health care. If you speak a foreign language, or don't understand the discussions, assistance will be provided to allow you to make informed health care decisions. You have the right to know your treatment options and to participate in decisions about your care, including the right to accept or refuse treatment. You have the right to expect a reasonable response to your requests for treatment or service.   You have the right to talk in confidence with health care providers and to have your health care information protected. You have the right to receive an explanation of your bill. You have the right to complain about the service or product you receive. Patient Responsibilities:  Please provide complete and accurate information about your health insurance benefits and make arrangements for the timely payment of your bill. [facility] will, if possible, assume responsibility for billing your insurance (Medicare, Medicaid and commercial) for the prescribed equipment or devices. If your policy does not cover the prescribed product, or only covers a portion of the bill, you are responsible for any remaining balance. Return and Exchange Policy:  [facility] will honor published  Warranties for products. [facility] will accept returns or exchanges within 14 days from the date of receipt, providin) the product must be in new condition; 2) receipt as required; and 3) used disposable and hygiene products may only be returned due to a defective product. Note: Refunds will be issued in a timely manner, please allow 4-6 weeks for processing. Complaint Procedures and DME Consumer Protection Resources:  [facility] values you as a customer, and is committed to resolving patient concerns. This commitment includes understanding and documenting your concerns, conducting a review of internal procedures, and providing you with an explanation and resolution to your concerns. Should you have any questions about our services or billing process, please contact our office at (practice phone number). If we are unable to resolve the concern, you have the right to direct comments to the office of Consumer Protection, in the [address] or the [facility] office, without fear of repercussion. DMEPOS SUPPLIER STANDARDS    A supplier must be in compliance with all applicable Federal and [employer] and regulatory requirements.   A supplier must provide complete and accurate information on the DMEPOS supplier application. Any changes to this information must be reported to the [facility] within 30 days. An authorized individual (one whose signature is binding) must sign the application for billing privileges. A supplier must fill orders from its own inventory, or must contract with other companies for the purchase of items necessary to fill the order. A supplier may not contract with any entity that is currently excluded from the Medicare program, any [facility] program, or from any other Federal procurement or Nonprocurement programs. A supplier must advise beneficiaries that they may rent or purchase inexpensive or routinely purchased durable medical equipment, and of the purchase option for capped rental equipment. A supplier must notify beneficiaries of warranty coverage and honor all warranties under applicable State Law, and repair or replace free of charge Medicare covered items that are under warranty. A supplier must maintain a physical facility on an appropriate site. A supplier must permit CMS, or its agents to conduct on-site inspections to ascertain the supplier's compliance with these standards. The supplier location must be accessible to beneficiaries during reasonable business hours, and must maintain a visible sign and posted hours of operation. A supplier must maintain a primary business telephone listed under the name of the business in a [employer] or a toll free number available through directory assistance. The exclusive use of a beeper, answering machine or cell phone is prohibited. A supplier must have comprehensive liability insurance in the amount of at least $300,000 that covers both the supplier's place of business and all customers and employees of the supplier. If the supplier manufactures its own items, this insurance must also cover product liability and completed operations.   A supplier must agree not to initiate telephone contact with beneficiaries, with a few exceptions allowed. This standard prohibits suppliers from calling beneficiaries in order to solicit new business. A supplier is responsible for delivery and must instruct beneficiaries on use of Medicare covered items, and maintain proof of delivery. A supplier must answer questions, and respond to complaints of the beneficiaries, and maintain documentation of such contacts. A supplier must maintain and replace at no charge or repair directly, or through a service contract with another company, Medicare covered items it has rented to beneficiaries. A supplier must accept returns of substandard (less than full quality for the particular item) or unsuitable items (inappropriate for the beneficiary at the time it was fitted and rented or sold) from beneficiaries. A supplier must disclose these supplier standards to each beneficiary to whom it supplies a Medicare-covered item. A supplier must disclose to the government any person having ownership, financial, or control interest in the supplier. A supplier must not convey or reassign a supplier number; i.e., the supplier may not sell or allow another entity to use its Medicare billing number. A supplier must have a complaint resolution protocol established to address beneficiary complaints that relate to these standards. A record of these complaints must be maintained at the physical facility. Complaint records must include: the name, address, telephone number and health insurance claim number of the beneficiary, a summary of the complaint, and any action taken to resolve it. A supplier must agree to furnish CMS any information required by the Medicare statute and implementing regulations. A supplier of DMEPOS and other items and services must be accredited by a CMS-approved accreditation organization in order to receive and retain a supplier billing number.  The accreditation must indicate the specific products and services, for which the supplier is accredited in order for the supplier to receive payment for those specific products and services. A DMEPOS supplier must notify their accreditation organization when a new DMEPOS location is opened. The accreditation organization may accredit the new supplier location for three months after it is operational without requiring a new site visit. All DMEPOS supplier locations, whether owned or subcontracted, must meet the [employer] and be separately accredited in order to bill Medicare. An accredited supplier may be denied enrollment or their enrollment may be revoked, if CMS determines that they are not in compliance with the DMEPOS quality standards. A DMEPOS supplier must disclose upon enrollment all products and services, including the addition of new product lines for which they are seeking accreditation. If a new product line is added after enrollment, the DMEPOS supplier will be responsible for notifying the accrediting body of the new product so that the DMEPOS supplier can be re-surveyed and accredited for these new products. Must meet the surety bond requirements specified in 42 C. F.R. 424.57(c). Implementation date- May 4, 2009. A supplier must obtain oxygen from a state-licensed oxygen supplier. A supplier must maintain ordering and referring documentation consistent with provisions found in 42 C. F.R. 424.516(f). DMEPOS suppliers are prohibited from sharing a practice location with certain other Medicare providers and suppliers. DMEPOS suppliers must remain open to the public for a minimum of 30 hours per week with certain exceptions.

## 2022-06-28 NOTE — PROGRESS NOTES
The patient was prescribed a walker boot for the patient's left foot. The patient wears a size 8.5 shoe and I fitted them with a M size boot. The patient was fitted and instructed on the use of prescribed walker boot. I explained how to fit themselves and that the plastic flexible piece should always be on the front of the boot and secured by the Velcro straps on top. The air bladder in the boot was adjusted according to proper fit and comfort. The patient walked a short distance and acknowledged satisfaction with current fit. I also explained that they need a heel lift or a higher heeled shoe for the uninvolved LE to help normalize gait and avoid excessive low back stress/strain due to leg length inequality created from walker boot. Patient read and signed documenting they understand and agree to [facility]'s current DME return policy.

## 2022-07-05 RX ORDER — LEVOTHYROXINE SODIUM 150 UG/1
TABLET ORAL
Qty: 30 TABLET | Refills: 0 | Status: SHIPPED | OUTPATIENT
Start: 2022-07-05 | End: 2022-08-01

## 2022-07-05 RX ORDER — BUPROPION HYDROCHLORIDE 150 MG/1
TABLET ORAL
Qty: 30 TABLET | Refills: 0 | Status: SHIPPED | OUTPATIENT
Start: 2022-07-05 | End: 2022-07-08 | Stop reason: SDUPTHER

## 2022-07-08 ENCOUNTER — NURSE ONLY (OUTPATIENT)
Dept: FAMILY MEDICINE CLINIC | Facility: CLINIC | Age: 47
End: 2022-07-08

## 2022-07-08 ENCOUNTER — OFFICE VISIT (OUTPATIENT)
Dept: FAMILY MEDICINE CLINIC | Facility: CLINIC | Age: 47
End: 2022-07-08
Payer: COMMERCIAL

## 2022-07-08 VITALS
SYSTOLIC BLOOD PRESSURE: 125 MMHG | RESPIRATION RATE: 16 BRPM | HEIGHT: 67 IN | BODY MASS INDEX: 32.8 KG/M2 | DIASTOLIC BLOOD PRESSURE: 56 MMHG | OXYGEN SATURATION: 99 % | HEART RATE: 100 BPM | TEMPERATURE: 97.5 F | WEIGHT: 209 LBS

## 2022-07-08 DIAGNOSIS — J45.20 MILD INTERMITTENT ASTHMA WITHOUT COMPLICATION: ICD-10-CM

## 2022-07-08 DIAGNOSIS — Z00.00 ROUTINE GENERAL MEDICAL EXAMINATION AT A HEALTH CARE FACILITY: Primary | ICD-10-CM

## 2022-07-08 DIAGNOSIS — M25.522 LEFT ELBOW PAIN: ICD-10-CM

## 2022-07-08 DIAGNOSIS — Z12.11 SCREENING FOR MALIGNANT NEOPLASM OF COLON: ICD-10-CM

## 2022-07-08 DIAGNOSIS — F51.01 PRIMARY INSOMNIA: ICD-10-CM

## 2022-07-08 DIAGNOSIS — Z11.4 SCREENING FOR HIV (HUMAN IMMUNODEFICIENCY VIRUS): ICD-10-CM

## 2022-07-08 DIAGNOSIS — E03.9 ACQUIRED HYPOTHYROIDISM: ICD-10-CM

## 2022-07-08 DIAGNOSIS — Z13.1 SCREENING FOR DIABETES MELLITUS: ICD-10-CM

## 2022-07-08 DIAGNOSIS — Z13.0 SCREENING, ANEMIA, DEFICIENCY, IRON: ICD-10-CM

## 2022-07-08 DIAGNOSIS — M51.36 DDD (DEGENERATIVE DISC DISEASE), LUMBAR: ICD-10-CM

## 2022-07-08 DIAGNOSIS — Z13.6 SCREENING FOR CARDIOVASCULAR CONDITION: ICD-10-CM

## 2022-07-08 DIAGNOSIS — B00.1 FEVER BLISTER: ICD-10-CM

## 2022-07-08 DIAGNOSIS — F33.42 RECURRENT MAJOR DEPRESSIVE DISORDER, IN FULL REMISSION (HCC): ICD-10-CM

## 2022-07-08 LAB
BASOPHILS # BLD: 0 K/UL (ref 0–0.2)
BASOPHILS NFR BLD: 0 % (ref 0–2)
DIFFERENTIAL METHOD BLD: ABNORMAL
EOSINOPHIL # BLD: 0 K/UL (ref 0–0.8)
EOSINOPHIL NFR BLD: 0 % (ref 0.5–7.8)
ERYTHROCYTE [DISTWIDTH] IN BLOOD BY AUTOMATED COUNT: 12.8 % (ref 11.9–14.6)
HCT VFR BLD AUTO: 45.2 % (ref 35.8–46.3)
HGB BLD-MCNC: 14.9 G/DL (ref 11.7–15.4)
IMM GRANULOCYTES # BLD AUTO: 0 K/UL (ref 0–0.5)
IMM GRANULOCYTES NFR BLD AUTO: 1 % (ref 0–5)
LYMPHOCYTES # BLD: 2 K/UL (ref 0.5–4.6)
LYMPHOCYTES NFR BLD: 34 % (ref 13–44)
MCH RBC QN AUTO: 31.6 PG (ref 26.1–32.9)
MCHC RBC AUTO-ENTMCNC: 33 G/DL (ref 31.4–35)
MCV RBC AUTO: 95.8 FL (ref 79.6–97.8)
MONOCYTES # BLD: 0.6 K/UL (ref 0.1–1.3)
MONOCYTES NFR BLD: 10 % (ref 4–12)
NEUTS SEG # BLD: 3.2 K/UL (ref 1.7–8.2)
NEUTS SEG NFR BLD: 55 % (ref 43–78)
NRBC # BLD: 0 K/UL (ref 0–0.2)
PLATELET # BLD AUTO: 188 K/UL (ref 150–450)
PMV BLD AUTO: 12.6 FL (ref 9.4–12.3)
RBC # BLD AUTO: 4.72 M/UL (ref 4.05–5.2)
WBC # BLD AUTO: 5.8 K/UL (ref 4.3–11.1)

## 2022-07-08 PROCEDURE — 99214 OFFICE O/P EST MOD 30 MIN: CPT | Performed by: FAMILY MEDICINE

## 2022-07-08 PROCEDURE — 99396 PREV VISIT EST AGE 40-64: CPT | Performed by: FAMILY MEDICINE

## 2022-07-08 RX ORDER — TRAZODONE HYDROCHLORIDE 150 MG/1
150 TABLET ORAL NIGHTLY
Qty: 90 TABLET | Refills: 3 | Status: SHIPPED | OUTPATIENT
Start: 2022-07-08

## 2022-07-08 RX ORDER — BUPROPION HYDROCHLORIDE 150 MG/1
TABLET ORAL
Qty: 90 TABLET | Refills: 3 | Status: SHIPPED | OUTPATIENT
Start: 2022-07-08 | End: 2022-08-08 | Stop reason: SDUPTHER

## 2022-07-08 RX ORDER — SERTRALINE HYDROCHLORIDE 100 MG/1
TABLET, FILM COATED ORAL
Qty: 135 TABLET | Refills: 3 | Status: SHIPPED | OUTPATIENT
Start: 2022-07-08 | End: 2022-08-08 | Stop reason: SDUPTHER

## 2022-07-08 RX ORDER — ACYCLOVIR 800 MG/1
800 TABLET ORAL 2 TIMES DAILY
Qty: 20 TABLET | Refills: 11 | Status: SHIPPED | OUTPATIENT
Start: 2022-07-08

## 2022-07-08 RX ORDER — GABAPENTIN 100 MG/1
100 CAPSULE ORAL NIGHTLY
Qty: 90 CAPSULE | Refills: 3 | Status: SHIPPED | OUTPATIENT
Start: 2022-07-08 | End: 2022-09-07

## 2022-07-08 ASSESSMENT — PATIENT HEALTH QUESTIONNAIRE - PHQ9
3. TROUBLE FALLING OR STAYING ASLEEP: 3
7. TROUBLE CONCENTRATING ON THINGS, SUCH AS READING THE NEWSPAPER OR WATCHING TELEVISION: 3
6. FEELING BAD ABOUT YOURSELF - OR THAT YOU ARE A FAILURE OR HAVE LET YOURSELF OR YOUR FAMILY DOWN: 0
SUM OF ALL RESPONSES TO PHQ QUESTIONS 1-9: 12
8. MOVING OR SPEAKING SO SLOWLY THAT OTHER PEOPLE COULD HAVE NOTICED. OR THE OPPOSITE, BEING SO FIGETY OR RESTLESS THAT YOU HAVE BEEN MOVING AROUND A LOT MORE THAN USUAL: 0
9. THOUGHTS THAT YOU WOULD BE BETTER OFF DEAD, OR OF HURTING YOURSELF: 0
5. POOR APPETITE OR OVEREATING: 1
SUM OF ALL RESPONSES TO PHQ9 QUESTIONS 1 & 2: 3
SUM OF ALL RESPONSES TO PHQ QUESTIONS 1-9: 12
2. FEELING DOWN, DEPRESSED OR HOPELESS: 1
4. FEELING TIRED OR HAVING LITTLE ENERGY: 2
SUM OF ALL RESPONSES TO PHQ QUESTIONS 1-9: 12
SUM OF ALL RESPONSES TO PHQ QUESTIONS 1-9: 12
1. LITTLE INTEREST OR PLEASURE IN DOING THINGS: 2
10. IF YOU CHECKED OFF ANY PROBLEMS, HOW DIFFICULT HAVE THESE PROBLEMS MADE IT FOR YOU TO DO YOUR WORK, TAKE CARE OF THINGS AT HOME, OR GET ALONG WITH OTHER PEOPLE: 1

## 2022-07-08 NOTE — PROGRESS NOTES
[address] [facility] [address]  Phone: [phone] Fax [phone]  Vivien Lynn MD          Ms. Sully Hanna  is a 55y.o.  year old  female patient who comes in for complete physical. She has been doing ok. No shortness of breath, chest pain, palpitations. No abdominal pain, diarrhea, or constipation. No abnormal skin changes. Has not been working on diet and exercise. ROS:  Feeling well. No dyspnea or chest pain on exertion. No abdominal pain, change in bowel habits, black or bloody stools. No urinary tract symptoms. GYN ROS: no breast pain or new or enlarging lumps on self exam. Menopausal symptoms: none. No neurological complaints. Last Colonoscopy: none  Last Mammogram: 5/22  Last PAP: 12/21      She had a significant injury to her right foot and ankle and has had surgery recently and has been basically nonambulatory and using crutches for several months. She has gained weight as a result of that. She is very irritable and anxious and is wondering if there is anything she can take for that. She is currently on Zoloft 100 mg daily and Wellbutrin  mg daily. No thoughts of suicide. She usually does very well with her psych medicines but because of the issues with stress of her right foot and her family stresses she needs something now. She also has a lot of trouble with her left elbow bothering her just this past week. She has been using crutches and she is not sure if it is from something with that. It hurts in the muscle rather than the joint.     PHQ-9  7/8/2022   Little interest or pleasure in doing things 2   Little interest or pleasure in doing things -   Feeling down, depressed, or hopeless 1   Trouble falling or staying asleep, or sleeping too much 3   Trouble falling or staying asleep, or sleeping too much -   Feeling tired or having little energy 2   Feeling tired or having little energy -   Poor appetite or overeating 1 Poor appetite, weight loss, or overeating -   Feeling bad about yourself - or that you are a failure or have let yourself or your family down 0   Feeling bad about yourself - or that you are a failure or have let yourself or your family down -   Trouble concentrating on things, such as reading the newspaper or watching television 3   Trouble concentrating on things such as school, work, reading, or watching TV -   Moving or speaking so slowly that other people could have noticed. Or the opposite - being so fidgety or restless that you have been moving around a lot more than usual 0   Moving or speaking so slowly that other people could have noticed; or the opposite being so fidgety that others notice -   Thoughts that you would be better off dead, or of hurting yourself in some way 0   Thoughts of being better off dead, or hurting yourself in some way -   PHQ-2 Score 3   Total Score PHQ 2 -   PHQ-9 Total Score 12   PHQ 9 Score -   If you checked off any problems, how difficult have these problems made it for you to do your work, take care of things at home, or get along with other people? 1   How difficult have these problems made it for you to do your work, take care of your home and get along with others -         Ms. Dumont  has  has a past medical history of Allergic rhinitis due to pollen, Arthritis, Cancer ([facility] [address] DDD (degenerative disc disease), lumbar, Depressed, History of kidney donation, Hypothyroidism due to acquired atrophy of thyroid, and Thyroid disease. Ms. Dumont  has  has a past surgical history that includes Knee arthroscopy (Bilateral, 4/20/15); orthopedic surgery (Left); gyn; Urological Surgery (2016); heent (2014); hernia repair;  section; Ankle arthroscopy (Right, 2022); and Ankle surgery ().     Ms. Dumont   Current Outpatient Medications   Medication Sig Dispense Refill    sertraline (ZOLOFT) 100 MG tablet One and a half tabs daily 135 tablet 3    gabapentin (NEURONTIN) 100 MG capsule Take 1 capsule by mouth nightly. 90 capsule 3    buPROPion (WELLBUTRIN XL) 150 MG extended release tablet TAKE 1 TABLET BY MOUTH ONCE DAILY IN THE MORNING 90 tablet 3    traZODone (DESYREL) 150 MG tablet Take 1 tablet by mouth nightly Takes 1\3 pill at night 50 mg 90 tablet 3    acyclovir (ZOVIRAX) 800 MG tablet Take 1 tablet by mouth 2 times daily TAKE 1 TABLET BY MOUTH TWICE A DAY FOR 10 DAYS 20 tablet 11    EUTHYROX 150 MCG tablet TAKE 1 TABLET BY MOUTH ONCE DAILY BEFORE BREAKFAST 30 tablet 0    Multiple Vitamin (MULTIVITAMIN ADULT PO) Take by mouth nightly      URINARY HEALTH/CRANBERRY PO Take 1 tablet by mouth daily      Biotin 5000 MCG TABS Take 1 tablet by mouth daily      albuterol sulfate  (90 Base) MCG/ACT inhaler Inhale 1 puff into the lungs every 4 hours as needed      Cetirizine HCl (ZYRTEC ALLERGY) 10 MG CAPS Take 10 mg by mouth nightly       vitamin D (CHOLECALCIFEROL) 25 MCG (1000 UT) TABS tablet Take by mouth daily      fluticasone (FLONASE) 50 MCG/ACT nasal spray 2 sprays by Nasal route daily      levonorgestrel (MIRENA, 52 MG,) IUD 52 mg 1 Device by IntraUTERine route once      loratadine (CLARITIN) 10 MG tablet Take 10 mg by mouth daily       melatonin 5 MG TABS tablet Take 10 mg by mouth nightly        No current facility-administered medications for this visit. Ms. Kesha Churchill family history includes Cancer in her father, paternal grandfather, and paternal grandmother; Dementia in her maternal grandmother; Hypertension in her father; Lung Disease in her maternal grandfather; Psychiatric Disorder in her mother; Stroke in her maternal grandfather.       Ms. Janiya Briseno History     Socioeconomic History    Marital status:      Spouse name: Not on file    Number of children: Not on file    Years of education: Not on file    Highest education level: Not on file   Occupational History    Not on file   Tobacco Use    Smoking status: Never Smoker    Smokeless tobacco: Never Used   Vaping Use    Vaping Use: Never used   Substance and Sexual Activity    Alcohol use: Yes     Comment: ocassional    Drug use: Never    Sexual activity: Not on file   Other Topics Concern    Not on file   Social History Narrative    Not on file     Social Determinants of Health     Financial Resource Strain:     Difficulty of Paying Living Expenses: Not on file   Food Insecurity:     Worried About Running Out of Food in the Last Year: Not on file    Jeremiah of Food in the Last Year: Not on file   Transportation Needs:     Lack of Transportation (Medical): Not on file    Lack of Transportation (Non-Medical): Not on file   Physical Activity:     Days of Exercise per Week: Not on file    Minutes of Exercise per Session: Not on file   Stress:     Feeling of Stress : Not on file   Social Connections:     Frequency of Communication with Friends and Family: Not on file    Frequency of Social Gatherings with Friends and Family: Not on file    Attends Jainism Services: Not on file    Active Member of [employer] or Organizations: Not on file    Attends Club or Organization Meetings: Not on file    Marital Status: Not on file   Intimate Partner Violence:     Fear of Current or Ex-Partner: Not on file    Emotionally Abused: Not on file    Physically Abused: Not on file    Sexually Abused: Not on file   Housing Stability:     Unable to Pay for Housing in the Last Year: Not on file    Number of [place] in the Last Year: Not on file    Unstable Housing in the Last Year: Not on file         Ms. Betzy Moore  has the following allergies: Allergies   Allergen Reactions    Flurbiprofen Other (See Comments)     donated kidney    Red Dye Hives     #40       BP (!) 125/56   Pulse 100   Temp 97.5 °F (36.4 °C)   Resp 16   Ht 5' 7\" (1.702 m)   Wt 209 lb (94.8 kg)   SpO2 99%   BMI 32.73 kg/m²     General: alert, oriented x 3, pleasant.   HEENT: Normocephalic, atraumatic, pupils equal and reactive to light. Neck: Supple, no masses or thyromegaly or lymphadenopathy. Lungs: clear to auscultation bilaterally without wheezing or rhonchi. CV: regular rate and rhythm, without murmurs, rubs, or gallops. Abdomen: soft, nontender, nondistended, no masses. No hepatosplenomegaly. No abdominal bruits. Ext: No lower extremity edema. No lesions about the feet. Breasts: Deferred. Pelvic: Deferred. Left elbow does reveal pain in the soft tissue of the upper left forearm in the lower upper arm right around her elbow. Forced pronation does cause pain around the lateral epicondyle. Skin: no lesions. Aviva Sharma was seen today for annual exam and elbow pain. Diagnoses and all orders for this visit:    Routine general medical examination at a health care facility    Screening for HIV (human immunodeficiency virus)  -     HIV 1/2 Ag/Ab, 4TH Generation,W Rflx Confirm; Future    Screening for diabetes mellitus  -     Comprehensive Metabolic Panel; Future    Screening for cardiovascular condition  -     Lipid Panel; Future    Screening, anemia, deficiency, iron  -     CBC with Auto Differential; Future    Recurrent major depressive disorder, in full remission (HCC)  -     sertraline (ZOLOFT) 100 MG tablet; One and a half tabs daily  -     buPROPion (WELLBUTRIN XL) 150 MG extended release tablet; TAKE 1 TABLET BY MOUTH ONCE DAILY IN THE MORNING    Acquired hypothyroidism  -     TSH; Future    Mild intermittent asthma without complication    Primary insomnia  -     traZODone (DESYREL) 150 MG tablet; Take 1 tablet by mouth nightly Takes 1pill at night 50 mg    DDD (degenerative disc disease), lumbar  -     gabapentin (NEURONTIN) 100 MG capsule; Take 1 capsule by mouth nightly. Fever blister  -     acyclovir (ZOVIRAX) 800 MG tablet;  Take 1 tablet by mouth 2 times daily TAKE 1 TABLET BY MOUTH TWICE A DAY FOR 10 DAYS    Screening for malignant neoplasm of colon  -     [facility] - Tressa Lopez MD, Gastroenterology    Left elbow pain    Do Tylenol for her elbow during the day and do Aleve 1 at night for the next week. If she is not better after that and resting and icing it she is to let us know. Increase her Zoloft to 150 mg daily and continue the Wellbutrin. Follow-up 1 month to see how she is doing with that. Continue all above medicines. 30 minutes of moderate aerobic exercise 5 days a week and green leafy vegetables daily for cardiovascular risk reduction.       Vanessa Andres MD

## 2022-07-09 LAB
ALBUMIN SERPL-MCNC: 4.3 G/DL (ref 3.5–5)
ALBUMIN/GLOB SERPL: 1.3 {RATIO} (ref 1.2–3.5)
ALP SERPL-CCNC: 89 U/L (ref 50–136)
ALT SERPL-CCNC: 36 U/L (ref 12–65)
ANION GAP SERPL CALC-SCNC: 9 MMOL/L (ref 7–16)
AST SERPL-CCNC: 21 U/L (ref 15–37)
BILIRUB SERPL-MCNC: 0.4 MG/DL (ref 0.2–1.1)
BUN SERPL-MCNC: 12 MG/DL (ref 6–23)
CALCIUM SERPL-MCNC: 9.9 MG/DL (ref 8.3–10.4)
CHLORIDE SERPL-SCNC: 109 MMOL/L (ref 98–107)
CHOLEST SERPL-MCNC: 210 MG/DL
CO2 SERPL-SCNC: 22 MMOL/L (ref 21–32)
CREAT SERPL-MCNC: 1 MG/DL (ref 0.6–1)
GLOBULIN SER CALC-MCNC: 3.2 G/DL (ref 2.3–3.5)
GLUCOSE SERPL-MCNC: 92 MG/DL (ref 65–100)
HDLC SERPL-MCNC: 46 MG/DL (ref 40–60)
HDLC SERPL: 4.6 {RATIO}
HIV 1+2 AB+HIV1 P24 AG SERPL QL IA: NONREACTIVE
HIV 1/2 RESULT COMMENT: NORMAL
LDLC SERPL CALC-MCNC: 114.6 MG/DL
POTASSIUM SERPL-SCNC: 4.9 MMOL/L (ref 3.5–5.1)
PROT SERPL-MCNC: 7.5 G/DL (ref 6.3–8.2)
SODIUM SERPL-SCNC: 140 MMOL/L (ref 136–145)
TRIGL SERPL-MCNC: 247 MG/DL (ref 35–150)
TSH, 3RD GENERATION: 1.66 UIU/ML (ref 0.36–3.74)
VLDLC SERPL CALC-MCNC: 49.4 MG/DL (ref 6–23)

## 2022-07-25 ENCOUNTER — TELEPHONE (OUTPATIENT)
Dept: ORTHOPEDIC SURGERY | Age: 47
End: 2022-07-25

## 2022-07-25 NOTE — TELEPHONE ENCOUNTER
She had surgery June 1. She is about ready to transition to her ASO brace but she cannot find hers. How can she get another?

## 2022-07-26 ENCOUNTER — OFFICE VISIT (OUTPATIENT)
Dept: ORTHOPEDIC SURGERY | Age: 47
End: 2022-07-26
Payer: COMMERCIAL

## 2022-07-26 DIAGNOSIS — M25.572 ACUTE LEFT ANKLE PAIN: Primary | ICD-10-CM

## 2022-07-26 PROCEDURE — L1902 AFO ANKLE GAUNTLET PRE OTS: HCPCS | Performed by: ORTHOPAEDIC SURGERY

## 2022-07-26 NOTE — LETTER
DME Patient Authorization Form    Name: Beverly Jim  : 1975  MRN: [id number]   Age: 55 y.o. Gender: female  Delivery Address: [facility]     Diagnosis:    1. Acute left ankle pain M25.572         Requested DME:  Wraptor Ankle Brace  ($129.00) x 1 - left        Clinical Notes:     **Indicates non-covered items by insurance. Payment expected on date of service. Electronically signed by  Provider: ___________Dr. Gentile____________________ Date: 2022                             [facility]/[address] Tax ID # [id number]        Durable Medical Equipment and/or Orthotics Patient Consent     I understand that my physician has prescribed this medical supply as part of my treatment plan as a matter of Medical Necessity.  I understand that I have a choice in where I receive my prescribed orthopedic supplies and/or services.  I authorize [facility] to furnish this service/product and to provide my insurance carrier with any information requested in order to process for payment.  I instruct my insurance carrier to pay [facility] directly for these services/products.  I understand that my insurance carrier may deny payment for this supply because it is a non-covered item, deemed not medically necessary or considered experimental.   I understand that any cost not covered by my insurance carrier will be solely my financial responsibility.  I have received the Supplier Standards and have reviewed them.  I have received the prescribed item and have been fully instructed on the proper use of the above services/products.    ______ (Patient Initials) I understand that all DME items are non-returnable after being dispensed. Items still in sealed packaging may be returned up to 14 days after purchasing.  [address] will replace items that are defective.    ______ (Patient Initials) I understand that [facility] will not file a claim with my insurance carrier for this service/product and I am waiving my right to file a claim on my own for this service/product with my insurance company as this item is NON-COVERED (Denoted by the **) by my Insurance company/policy. ______ (Patient Initials) I understand that I am responsible to bring my equipment to the hospital for any surgery. ______________________________________________  ________________________  Patient / Kaur Cruz            Thank you for considering [address] Your physician has prescribed specific medical equipment or devices for your home use. The following describes your rights and responsibilities as our customer. Right to Choose Providers: You have a choice regarding which company supplies your home medical equipment and devices, and to consult your physician in this decision. You may choose a medical supply store, a home medical equipment provider, or a specialist such as [facility]. [facility] will coordinate with your physician to provide the medical equipment or devices prescribed for your home use. Right to Service:  You have the right to considerate, respectful and nondiscriminatory care. You have the right to receive accurate and easily understood information about your health care. If you speak a foreign language, or don't understand the discussions, assistance will be provided to allow you to make informed health care decisions. You have the right to know your treatment options and to participate in decisions about your care, including the right to accept or refuse treatment. You have the right to expect a reasonable response to your requests for treatment or service.   You have the right to talk in confidence with health care providers and to have your health care information protected. You have the right to receive an explanation of your bill. You have the right to complain about the service or product you receive. Patient Responsibilities:  Please provide complete and accurate information about your health insurance benefits and make arrangements for the timely payment of your bill. [facility] will, if possible, assume responsibility for billing your insurance (Medicare, Medicaid and commercial) for the prescribed equipment or devices. If your policy does not cover the prescribed product, or only covers a portion of the bill, you are responsible for any remaining balance. Return and Exchange Policy:  [facility] will honor published  Warranties for products. [facility] will accept returns or exchanges within 14 days from the date of receipt, providin) the product must be in new condition; 2) receipt as required; and 3) used disposable and hygiene products may only be returned due to a defective product. Note: Refunds will be issued in a timely manner, please allow 4-6 weeks for processing. Complaint Procedures and DME Consumer Protection Resources:  [facility] values you as a customer, and is committed to resolving patient concerns. This commitment includes understanding and documenting your concerns, conducting a review of internal procedures, and providing you with an explanation and resolution to your concerns. Should you have any questions about our services or billing process, please contact our office at (practice phone number). If we are unable to resolve the concern, you have the right to direct comments to the office of Consumer Protection, in the [address] or the [facility] office, without fear of repercussion. DMEPOS SUPPLIER STANDARDS    A supplier must be in compliance with all applicable Federal and [employer] and regulatory requirements.   A supplier must provide complete and accurate information on the DMEPOS supplier application. Any changes to this information must be reported to the [facility] within 30 days. An authorized individual (one whose signature is binding) must sign the application for billing privileges. A supplier must fill orders from its own inventory, or must contract with other companies for the purchase of items necessary to fill the order. A supplier may not contract with any entity that is currently excluded from the Medicare program, any [facility] program, or from any other Federal procurement or Nonprocurement programs. A supplier must advise beneficiaries that they may rent or purchase inexpensive or routinely purchased durable medical equipment, and of the purchase option for capped rental equipment. A supplier must notify beneficiaries of warranty coverage and honor all warranties under applicable State Law, and repair or replace free of charge Medicare covered items that are under warranty. A supplier must maintain a physical facility on an appropriate site. A supplier must permit CMS, or its agents to conduct on-site inspections to ascertain the supplier's compliance with these standards. The supplier location must be accessible to beneficiaries during reasonable business hours, and must maintain a visible sign and posted hours of operation. A supplier must maintain a primary business telephone listed under the name of the business in a [employer] or a toll free number available through directory assistance. The exclusive use of a beeper, answering machine or cell phone is prohibited. A supplier must have comprehensive liability insurance in the amount of at least $300,000 that covers both the supplier's place of business and all customers and employees of the supplier. If the supplier manufactures its own items, this insurance must also cover product liability and completed operations.   A supplier must agree not to initiate telephone contact with beneficiaries, with a few exceptions allowed. This standard prohibits suppliers from calling beneficiaries in order to solicit new business. A supplier is responsible for delivery and must instruct beneficiaries on use of Medicare covered items, and maintain proof of delivery. A supplier must answer questions, and respond to complaints of the beneficiaries, and maintain documentation of such contacts. A supplier must maintain and replace at no charge or repair directly, or through a service contract with another company, Medicare covered items it has rented to beneficiaries. A supplier must accept returns of substandard (less than full quality for the particular item) or unsuitable items (inappropriate for the beneficiary at the time it was fitted and rented or sold) from beneficiaries. A supplier must disclose these supplier standards to each beneficiary to whom it supplies a Medicare-covered item. A supplier must disclose to the government any person having ownership, financial, or control interest in the supplier. A supplier must not convey or reassign a supplier number; i.e., the supplier may not sell or allow another entity to use its Medicare billing number. A supplier must have a complaint resolution protocol established to address beneficiary complaints that relate to these standards. A record of these complaints must be maintained at the physical facility. Complaint records must include: the name, address, telephone number and health insurance claim number of the beneficiary, a summary of the complaint, and any action taken to resolve it. A supplier must agree to furnish CMS any information required by the Medicare statute and implementing regulations. A supplier of DMEPOS and other items and services must be accredited by a CMS-approved accreditation organization in order to receive and retain a supplier billing number.  The accreditation must indicate the specific products and services, for which the supplier is accredited in order for the supplier to receive payment for those specific products and services. A DMEPOS supplier must notify their accreditation organization when a new DMEPOS location is opened. The accreditation organization may accredit the new supplier location for three months after it is operational without requiring a new site visit. All DMEPOS supplier locations, whether owned or subcontracted, must meet the [employer] and be separately accredited in order to bill Medicare. An accredited supplier may be denied enrollment or their enrollment may be revoked, if CMS determines that they are not in compliance with the DMEPOS quality standards. A DMEPOS supplier must disclose upon enrollment all products and services, including the addition of new product lines for which they are seeking accreditation. If a new product line is added after enrollment, the DMEPOS supplier will be responsible for notifying the accrediting body of the new product so that the DMEPOS supplier can be re-surveyed and accredited for these new products. Must meet the surety bond requirements specified in 42 C. F.R. 424.57(c). Implementation date- May 4, 2009. A supplier must obtain oxygen from a state-licensed oxygen supplier. A supplier must maintain ordering and referring documentation consistent with provisions found in 42 C. F.R. 424.516(f). DMEPOS suppliers are prohibited from sharing a practice location with certain other Medicare providers and suppliers. DMEPOS suppliers must remain open to the public for a minimum of 30 hours per week with certain exceptions.

## 2022-07-26 NOTE — PROGRESS NOTES
The patient was prescribed a Wraptor brace for the patient's leftfoot. The patient wears a size 8.5 shoe and I fitted the patient with a M brace. I explained how to fit the brace properly by pulling the lace tabs across top of foot first then under arch and lastly pulling the strap up firmly and attaching to the lateral Velcro strip. Thus forming a figure 8 across the ankle joint. Once the figure 8 is completed they are to secure the top (short circumferential) straps to help avoid the straps from loosening with normal wear. The patient was able to demonstrate proper fitting in office to ensure compliance with device and acknowledged satisfaction with current fit. Patient read and signed documenting they understand and agree to [facility]'s current DME return policy.

## 2022-08-01 RX ORDER — LEVOTHYROXINE SODIUM 150 UG/1
TABLET ORAL
Qty: 30 TABLET | Refills: 11 | Status: SHIPPED | OUTPATIENT
Start: 2022-08-01

## 2022-08-08 ENCOUNTER — OFFICE VISIT (OUTPATIENT)
Dept: FAMILY MEDICINE CLINIC | Facility: CLINIC | Age: 47
End: 2022-08-08
Payer: COMMERCIAL

## 2022-08-08 VITALS
TEMPERATURE: 98 F | OXYGEN SATURATION: 97 % | WEIGHT: 208.6 LBS | SYSTOLIC BLOOD PRESSURE: 112 MMHG | BODY MASS INDEX: 32.74 KG/M2 | HEIGHT: 67 IN | DIASTOLIC BLOOD PRESSURE: 61 MMHG | RESPIRATION RATE: 16 BRPM | HEART RATE: 87 BPM

## 2022-08-08 DIAGNOSIS — F33.42 RECURRENT MAJOR DEPRESSIVE DISORDER, IN FULL REMISSION (HCC): Primary | ICD-10-CM

## 2022-08-08 PROCEDURE — 99213 OFFICE O/P EST LOW 20 MIN: CPT | Performed by: FAMILY MEDICINE

## 2022-08-08 RX ORDER — BUPROPION HYDROCHLORIDE 150 MG/1
TABLET ORAL
Qty: 90 TABLET | Refills: 3 | Status: SHIPPED | OUTPATIENT
Start: 2022-08-08

## 2022-08-08 RX ORDER — SERTRALINE HYDROCHLORIDE 100 MG/1
TABLET, FILM COATED ORAL
Qty: 135 TABLET | Refills: 3 | Status: SHIPPED | OUTPATIENT
Start: 2022-08-08

## 2022-08-08 ASSESSMENT — PATIENT HEALTH QUESTIONNAIRE - PHQ9
SUM OF ALL RESPONSES TO PHQ QUESTIONS 1-9: 6
1. LITTLE INTEREST OR PLEASURE IN DOING THINGS: 3
9. THOUGHTS THAT YOU WOULD BE BETTER OFF DEAD, OR OF HURTING YOURSELF: 0
SUM OF ALL RESPONSES TO PHQ QUESTIONS 1-9: 9
SUM OF ALL RESPONSES TO PHQ9 QUESTIONS 1 & 2: 1
8. MOVING OR SPEAKING SO SLOWLY THAT OTHER PEOPLE COULD HAVE NOTICED. OR THE OPPOSITE, BEING SO FIGETY OR RESTLESS THAT YOU HAVE BEEN MOVING AROUND A LOT MORE THAN USUAL: 0
SUM OF ALL RESPONSES TO PHQ QUESTIONS 1-9: 6
4. FEELING TIRED OR HAVING LITTLE ENERGY: 2
SUM OF ALL RESPONSES TO PHQ9 QUESTIONS 1 & 2: 6
6. FEELING BAD ABOUT YOURSELF - OR THAT YOU ARE A FAILURE OR HAVE LET YOURSELF OR YOUR FAMILY DOWN: 0
SUM OF ALL RESPONSES TO PHQ QUESTIONS 1-9: 6
1. LITTLE INTEREST OR PLEASURE IN DOING THINGS: 0
SUM OF ALL RESPONSES TO PHQ QUESTIONS 1-9: 6
5. POOR APPETITE OR OVEREATING: 2
SUM OF ALL RESPONSES TO PHQ QUESTIONS 1-9: 9
7. TROUBLE CONCENTRATING ON THINGS, SUCH AS READING THE NEWSPAPER OR WATCHING TELEVISION: 1
10. IF YOU CHECKED OFF ANY PROBLEMS, HOW DIFFICULT HAVE THESE PROBLEMS MADE IT FOR YOU TO DO YOUR WORK, TAKE CARE OF THINGS AT HOME, OR GET ALONG WITH OTHER PEOPLE: 0
2. FEELING DOWN, DEPRESSED OR HOPELESS: 3
DEPRESSION UNABLE TO ASSESS: URGENT/EMERGENT SITUATION
2. FEELING DOWN, DEPRESSED OR HOPELESS: 1
3. TROUBLE FALLING OR STAYING ASLEEP: 3
SUM OF ALL RESPONSES TO PHQ QUESTIONS 1-9: 9
SUM OF ALL RESPONSES TO PHQ QUESTIONS 1-9: 9

## 2022-08-08 NOTE — RESULT ENCOUNTER NOTE
Let patient know labs normal except cholesterol is a little high. Work on fats in her diet. I forgot to talk to her about this when she was here today.

## 2022-08-08 NOTE — PROGRESS NOTES
[address] [facility] [address]  [address]  Phone: [phone] Fax [phone]  Sina Baez MD  8/8/2022           Ms. Alena Mccann  is a 55y.o.  year old  female patient who comes in for depression. She is doing well with the increase of the Zoloft. She did just lose her brother to an accidental overdose she is grieving that but otherwise feels like it really has helped her. She continues the Wellbutrin. No thoughts of suicide. [facility] PHQ-9  8/8/2022   Depression Unable to Assess -   Little interest or pleasure in doing things 0   Little interest or pleasure in doing things -   Feeling down, depressed, or hopeless 1   Trouble falling or staying asleep, or sleeping too much 3   Trouble falling or staying asleep, or sleeping too much -   Feeling tired or having little energy 2   Feeling tired or having little energy -   Poor appetite or overeating 2   Poor appetite, weight loss, or overeating -   Feeling bad about yourself - or that you are a failure or have let yourself or your family down 0   Feeling bad about yourself - or that you are a failure or have let yourself or your family down -   Trouble concentrating on things, such as reading the newspaper or watching television 1   Trouble concentrating on things such as school, work, reading, or watching TV -   Moving or speaking so slowly that other people could have noticed.  Or the opposite - being so fidgety or restless that you have been moving around a lot more than usual 0   Moving or speaking so slowly that other people could have noticed; or the opposite being so fidgety that others notice -   Thoughts that you would be better off dead, or of hurting yourself in some way 0   Thoughts of being better off dead, or hurting yourself in some way -   PHQ-2 Score 1   Total Score PHQ 2 -   PHQ-9 Total Score 9   PHQ 9 Score -   If you checked off any problems, how difficult have these problems made it for you to do your work, take care of things at home, or get along with other people? 0   How difficult have these problems made it for you to do your work, take care of your home and get along with others -           Ms. Jay Jay Ni  has  has a past medical history of Allergic rhinitis due to pollen, Arthritis, Cancer ([facility] [address] DDD (degenerative disc disease), lumbar, Depressed, History of kidney donation, Hypothyroidism due to acquired atrophy of thyroid, and Thyroid disease. Ms. Jay Jay Ni  has  has a past surgical history that includes Knee arthroscopy (Bilateral, 4/20/15); orthopedic surgery (Left); gyn; Urological Surgery (2016); heent (2014); hernia repair;  section; Ankle arthroscopy (Right, 2022); and Ankle surgery (). Ms. Jay Jay Ni   Current Outpatient Medications   Medication Sig Dispense Refill    sertraline (ZOLOFT) 100 MG tablet One and a half tabs daily 135 tablet 3    buPROPion (WELLBUTRIN XL) 150 MG extended release tablet TAKE 1 TABLET BY MOUTH ONCE DAILY IN THE MORNING 90 tablet 3    levothyroxine (EUTHYROX) 150 MCG tablet TAKE 1 TABLET BY MOUTH ONCE DAILY BEFORE BREAKFAST 30 tablet 11    gabapentin (NEURONTIN) 100 MG capsule Take 1 capsule by mouth nightly.  90 capsule 3    traZODone (DESYREL) 150 MG tablet Take 1 tablet by mouth nightly Takes 1\3 pill at night 50 mg 90 tablet 3    acyclovir (ZOVIRAX) 800 MG tablet Take 1 tablet by mouth 2 times daily TAKE 1 TABLET BY MOUTH TWICE A DAY FOR 10 DAYS 20 tablet 11    Multiple Vitamin (MULTIVITAMIN ADULT PO) Take by mouth nightly      URINARY HEALTH/CRANBERRY PO Take 1 tablet by mouth daily      Biotin 5000 MCG TABS Take 1 tablet by mouth daily      albuterol sulfate  (90 Base) MCG/ACT inhaler Inhale 1 puff into the lungs every 4 hours as needed      Cetirizine HCl (ZYRTEC ALLERGY) 10 MG CAPS Take 10 mg by mouth nightly       vitamin D (CHOLECALCIFEROL) 25 MCG (1000 UT) TABS tablet Take by mouth daily      fluticasone (FLONASE) 50 MCG/ACT nasal spray 2 sprays by Nasal route daily      levonorgestrel (MIRENA) IUD 52 mg 1 Device by IntraUTERine route once      loratadine (CLARITIN) 10 MG tablet Take 10 mg by mouth daily       melatonin 5 MG TABS tablet Take 10 mg by mouth nightly        No current facility-administered medications for this visit. Ms. Jossy Guy History     Socioeconomic History    Marital status:      Spouse name: None    Number of children: None    Years of education: None    Highest education level: None   Tobacco Use    Smoking status: Never    Smokeless tobacco: Never   Vaping Use    Vaping Use: Never used   Substance and Sexual Activity    Alcohol use: Yes     Comment: ocassional    Drug use: Never       Ms. Butler [facility]   Family History   Problem Relation Age of Onset    Cancer Paternal Grandfather         leukemia    Stroke Maternal Grandfather     Lung Disease Maternal Grandfather         copd    Dementia Maternal Grandmother     Psychiatric Disorder Mother         bipolar/schizophrenia    Cancer Father         melanoma    Hypertension Father     Cancer Paternal Grandmother         lung            Ms. Butler [facility]  has the following allergies: Allergies   Allergen Reactions    Flurbiprofen Other (See Comments)     donated kidney    Red Dye Hives     #40       /61 (Site: Left Upper Arm, Position: Sitting, Cuff Size: Large Adult)   Pulse 87   Temp 98 °F (36.7 °C) (Temporal)   Resp 16   Ht 5' 7\" (1.702 m)   Wt 208 lb 9.6 oz (94.6 kg)   SpO2 97% Comment: room air sitting  BMI 32.67 kg/m²     HEENT: Normocephalic, atraumatic, pupils equal and reactive to light. Neck: Supple, no masses or thyromegaly. Lungs: clear to auscultation bilaterally. CV: regular rate and rhythm, without murmurs, rubs, or gallops  Ext: No lower extremity edema. Fadia Alejo was seen today for anxiety and depression.     Diagnoses and all orders for this visit:    Recurrent major depressive disorder, in full remission ([facility] [address]  -     sertraline (ZOLOFT) 100 MG tablet; One and a half tabs daily  -     buPROPion (WELLBUTRIN XL) 150 MG extended release tablet; TAKE 1 TABLET BY MOUTH ONCE DAILY IN THE MORNING        Continue the above medicines. Follow-up 1 year.   Wes Negrete MD

## 2022-08-30 ENCOUNTER — OFFICE VISIT (OUTPATIENT)
Dept: ORTHOPEDIC SURGERY | Age: 47
End: 2022-08-30

## 2022-08-30 DIAGNOSIS — M25.373 UNSTABLE ANKLE, UNSPECIFIED LATERALITY: Primary | ICD-10-CM

## 2022-08-30 PROCEDURE — 99024 POSTOP FOLLOW-UP VISIT: CPT | Performed by: ORTHOPAEDIC SURGERY

## 2022-08-30 NOTE — PROGRESS NOTES
Name: Climmie Najjar  YOB: 1975  Gender: female  MRN: [id number]      Procedure: Right Ankle Arthroscopy With Reconstruction - Right    Surgery Date: 6/1/2022    Subjective: Denies fevers chills or infection. Denies any signs of spreading erythema. She went on to [employer] [facility] to a concert. She went to a [employer] concert 2 weeks ago. She is putting 20,000 steps on her foot. Her only complaint is a little bit of numbness and tingling. She is doing so much better than she was prior to surgery. Exam: Wound healing appropriately. No signs of dehiscence or infection. No signs of erythema or drainage. Neurovascularly intact of the foot. With toes warm and well-perfused. No signs of instability. Imaging:   No imaging reviewed      Assessment/Plan:    Weight-bear as tolerated and compression ankle sleeve. Slow transition into ankle sleeve at all times. Use ASO ankle brace when doing big activity such as hiking or extensive concert going. Recommend no heavy exercise other than hiking or flat ground walking. She understands this. She will return in 3 months.

## 2022-09-04 DIAGNOSIS — M51.36 DDD (DEGENERATIVE DISC DISEASE), LUMBAR: ICD-10-CM

## 2022-09-07 RX ORDER — GABAPENTIN 100 MG/1
100 CAPSULE ORAL NIGHTLY
Qty: 30 CAPSULE | Refills: 0 | Status: SHIPPED | OUTPATIENT
Start: 2022-09-07 | End: 2023-09-07

## 2022-12-06 ENCOUNTER — OFFICE VISIT (OUTPATIENT)
Dept: ORTHOPEDIC SURGERY | Age: 47
End: 2022-12-06
Payer: COMMERCIAL

## 2022-12-06 DIAGNOSIS — M25.571 PAIN IN RIGHT ANKLE AND JOINTS OF RIGHT FOOT: Primary | ICD-10-CM

## 2022-12-06 PROCEDURE — 99213 OFFICE O/P EST LOW 20 MIN: CPT | Performed by: ORTHOPAEDIC SURGERY

## 2022-12-06 NOTE — PROGRESS NOTES
Name: Cristel Christopher  YOB: 1975  Gender: female  MRN: [id number]    Summary: Status post ankle arthroscopy for lateral talar large OCD lesion, lateral ankle reconstruction, peroneal tenosynovectomy. Doing well. Has returned to extensive hiking and high impact exercises. Follow-up as needed for tailor's bunion or ankle. CC: right ankle surgery    HPI: Cristel Christopher is a 52 y.o. female who on 6/1/2022 underwent right ankle arthroscopy for an posterior lateral talus osteochondral lesion, lateral ankle reconstruction, peroneal tendon tenosynovectomy. There is significant chondral fissuring noted. Postoperatively she has done very well. 12/6/2022: She is no longer using a brace. She is walking with a little ankle compression sleeve. She is traveling to [employer] in [place] at [employer]. She has been on trips across the [facility] and is walking multiple miles, hiking, and doing everything she wants to do. She is stating today that she has a little bit of tightness in the ankle but that is it. He denies any signs of increasing swelling pain or instability. She states that she is back to doing everything that she wishes to do. In September she went on a mission trip to [facility] in [facility] that were always deep. [place]. She is doing much better than she was prior to surgery. She is very pleased with her recovery at this point. Her main issue at this point is a tailor's bunion and she wants to know if this is something that may eventually require intervention. She uses silicone toe sleeves already on this. ROS/Meds/PSH/PMH/FH/SH: I personally reviewed the patients standard intake form. Below are the pertinents    Tobacco:  reports that she has never smoked.  She has never used smokeless tobacco.  Diabetes: None  No results found for: LABA1C  No results found for: EAG  Other: none    Physical Examination:  right lower: No Tenderness. Not able to produce any pain with palpation. +silt s/s/sp/dp/t. 5/5 strength to EHL/FHL/AT/PT/Jordan/Achilles. toes wwp w/ BCR <2s. no signs of acute injury. No open wounds. No pain with calf squeeze. Incsions have healed well with no signs of infection. She is nontender over her incision. Superficial peroneal nerve is intact with no signs of problem or discomfort. normal silverskoid exam: With the hindfoot in neutral and forefoot supinated there is good ankle dorsiflexion with the knee flexed and extended. Neutral hindfoot alignment. No cavovarus nor planovalgus foot deformity  Talar tilt exam : normal  Anterior drawer exam w/ ankle plantarflexed at 20 deg: normal  She has a small tailor's bunion that is palpable. It is not sensitive today and there is no signs of redness or inflamed bursa today. Neuro:  normal SILT to s/s/sp/dp/t. Reflexes normal: 1+ patella reflex bilaterally, 1+ achilles reflex bilaterally, negative babinski bilaterally. no signs of hyper reflexia or absent reflex    Vascular: Bilateral DP and PT: dorsalis pedis 4/4    Imaging:   No imaging reviewed     Assessment:   Right talar OCD lesion  Right ankle instability  Right tailor's bunion    Plan:   3 This is stable chronic illness/condition  Treatment at this time:  Concerning her tailor's bunion recommend silicone toe sleeves with a lateral protective silicone layer. For her ankle she can return to full activities with no restrictions. If she is going to do something high impact I do think she should wear a small ankle sleeve just for some mild protection.   Studies ordered: NO XR needed @ Next Visit    Weight-bearing status: WBAT        Return to work/work restrictions: none  No medications given    Shea Leblanc MD  Follow-up as needed    Shea Leblanc MD    Past Medical History:   Diagnosis Date    Allergic rhinitis due to pollen     Arthritis     knees  (fracture)    Cancer ([address]     basal cell skin cancer chest and forehead     DDD (degenerative disc disease), lumbar     Depressed     History of kidney donation     Hypothyroidism due to acquired atrophy of thyroid 9/16/2015    Thyroid disease     hypo- on meds         Current Outpatient Medications:     gabapentin (NEURONTIN) 100 MG capsule, TAKE 1 CAPSULE BY MOUTH NIGHTLY, Disp: 30 capsule, Rfl: 0    sertraline (ZOLOFT) 100 MG tablet, One and a half tabs daily, Disp: 135 tablet, Rfl: 3    buPROPion (WELLBUTRIN XL) 150 MG extended release tablet, TAKE 1 TABLET BY MOUTH ONCE DAILY IN THE MORNING, Disp: 90 tablet, Rfl: 3    levothyroxine (EUTHYROX) 150 MCG tablet, TAKE 1 TABLET BY MOUTH ONCE DAILY BEFORE BREAKFAST, Disp: 30 tablet, Rfl: 11    traZODone (DESYREL) 150 MG tablet, Take 1 tablet by mouth nightly Takes 1\3 pill at night 50 mg, Disp: 90 tablet, Rfl: 3    acyclovir (ZOVIRAX) 800 MG tablet, Take 1 tablet by mouth 2 times daily TAKE 1 TABLET BY MOUTH TWICE A DAY FOR 10 DAYS, Disp: 20 tablet, Rfl: 11    Multiple Vitamin (MULTIVITAMIN ADULT PO), Take by mouth nightly, Disp: , Rfl:     URINARY HEALTH/CRANBERRY PO, Take 1 tablet by mouth daily, Disp: , Rfl:     Biotin 5000 MCG TABS, Take 1 tablet by mouth daily, Disp: , Rfl:     albuterol sulfate  (90 Base) MCG/ACT inhaler, Inhale 1 puff into the lungs every 4 hours as needed, Disp: , Rfl:     Cetirizine HCl (ZYRTEC ALLERGY) 10 MG CAPS, Take 10 mg by mouth nightly , Disp: , Rfl:     vitamin D (CHOLECALCIFEROL) 25 MCG (1000 UT) TABS tablet, Take by mouth daily, Disp: , Rfl:     fluticasone (FLONASE) 50 MCG/ACT nasal spray, 2 sprays by Nasal route daily, Disp: , Rfl:     levonorgestrel (MIRENA) IUD 52 mg, 1 Device by IntraUTERine route once, Disp: , Rfl:     loratadine (CLARITIN) 10 MG tablet, Take 10 mg by mouth daily , Disp: , Rfl:     melatonin 5 MG TABS tablet, Take 10 mg by mouth nightly , Disp: , Rfl:

## 2022-12-16 ENCOUNTER — OFFICE VISIT (OUTPATIENT)
Dept: FAMILY MEDICINE CLINIC | Facility: CLINIC | Age: 47
End: 2022-12-16
Payer: COMMERCIAL

## 2022-12-16 ENCOUNTER — TELEPHONE (OUTPATIENT)
Dept: FAMILY MEDICINE CLINIC | Facility: CLINIC | Age: 47
End: 2022-12-16

## 2022-12-16 VITALS
WEIGHT: 209.2 LBS | OXYGEN SATURATION: 97 % | TEMPERATURE: 96.6 F | DIASTOLIC BLOOD PRESSURE: 65 MMHG | SYSTOLIC BLOOD PRESSURE: 121 MMHG | HEART RATE: 82 BPM | HEIGHT: 67 IN | RESPIRATION RATE: 18 BRPM | BODY MASS INDEX: 32.83 KG/M2

## 2022-12-16 DIAGNOSIS — F51.01 PRIMARY INSOMNIA: ICD-10-CM

## 2022-12-16 DIAGNOSIS — E66.09 CLASS 1 OBESITY DUE TO EXCESS CALORIES WITH SERIOUS COMORBIDITY AND BODY MASS INDEX (BMI) OF 32.0 TO 32.9 IN ADULT: Primary | ICD-10-CM

## 2022-12-16 DIAGNOSIS — E66.09 CLASS 1 OBESITY DUE TO EXCESS CALORIES WITHOUT SERIOUS COMORBIDITY WITH BODY MASS INDEX (BMI) OF 32.0 TO 32.9 IN ADULT: ICD-10-CM

## 2022-12-16 PROBLEM — E66.811 CLASS 1 OBESITY DUE TO EXCESS CALORIES WITHOUT SERIOUS COMORBIDITY IN ADULT: Status: ACTIVE | Noted: 2022-12-16

## 2022-12-16 PROCEDURE — 99214 OFFICE O/P EST MOD 30 MIN: CPT | Performed by: FAMILY MEDICINE

## 2022-12-16 RX ORDER — SEMAGLUTIDE 1.34 MG/ML
INJECTION, SOLUTION SUBCUTANEOUS
Qty: 4 ADJUSTABLE DOSE PRE-FILLED PEN SYRINGE | Refills: 5 | Status: SHIPPED | OUTPATIENT
Start: 2022-12-16

## 2022-12-16 RX ORDER — TRAZODONE HYDROCHLORIDE 150 MG/1
TABLET ORAL
Qty: 90 TABLET | Refills: 3 | Status: SHIPPED | OUTPATIENT
Start: 2022-12-16

## 2022-12-16 RX ORDER — TRAZODONE HYDROCHLORIDE 150 MG/1
150 TABLET ORAL NIGHTLY
Qty: 90 TABLET | Refills: 3 | Status: SHIPPED | OUTPATIENT
Start: 2022-12-16 | End: 2022-12-16 | Stop reason: SDUPTHER

## 2022-12-16 NOTE — TELEPHONE ENCOUNTER
Denise Kwong called stating that they are needing clarification on patients Trazodone. Rx needs to state if she is taking 1 tab or 1/3 tab.

## 2022-12-16 NOTE — PROGRESS NOTES
[address] [facility]  [address]  Phone: [phone] Fax [phone]  Yessenia Robins MD  2022           Ms. Albertina Cota  is a 52y.o.  year old  female patient who comes in complaining of a great deal of difficulty losing weight. She has tried weight watchers, keto, diet restricting kinds of diets and just cannot lose weight. She is concerned because she keeps gaining weight. She would like to try medication for it. She is even considering gastric bypass. She has also had a great deal of difficulty getting to sleep and she is out of her trazodone. She would like this refilled. Ms. Albertina Cota  has  has a past medical history of Allergic rhinitis due to pollen, Arthritis, Cancer ([address] DDD (degenerative disc disease), lumbar, Depressed, History of kidney donation, Hypothyroidism due to acquired atrophy of thyroid, and Thyroid disease. Ms. Albertina Cota  has  has a past surgical history that includes Knee arthroscopy (Bilateral, 4/20/15); orthopedic surgery (Left); gyn; Urological Surgery (2016); heent (2014); hernia repair;  section; Ankle arthroscopy (Right, 2022); and Ankle surgery ().     Ms. Albertina Cota   Current Outpatient Medications   Medication Sig Dispense Refill    Semaglutide,0.25 or 0.5MG/DOS, (OZEMPIC, 0.25 OR 0.5 MG/DOSE,) 2 MG/1.5ML SOPN 0.25 mg subQ once weekly 4 Adjustable Dose Pre-filled Pen Syringe 5    Insulin Pen Needle 31G X 5 MM MISC Use one weekly with ozempic 12 each 3    traZODone (DESYREL) 150 MG tablet Take 1 tablet by mouth nightly Takes 1\3 pill at night 50 mg 90 tablet 3    gabapentin (NEURONTIN) 100 MG capsule TAKE 1 CAPSULE BY MOUTH NIGHTLY 30 capsule 0    sertraline (ZOLOFT) 100 MG tablet One and a half tabs daily 135 tablet 3    buPROPion (WELLBUTRIN XL) 150 MG extended release tablet TAKE 1 TABLET BY MOUTH ONCE DAILY IN THE MORNING 90 tablet 3    levothyroxine (EUTHYROX) 150 MCG tablet TAKE 1 TABLET BY MOUTH ONCE DAILY BEFORE BREAKFAST 30 tablet 11    Multiple Vitamin (MULTIVITAMIN ADULT PO) Take by mouth nightly      URINARY HEALTH/CRANBERRY PO Take 1 tablet by mouth daily      Biotin 5000 MCG TABS Take 1 tablet by mouth daily      albuterol sulfate  (90 Base) MCG/ACT inhaler Inhale 1 puff into the lungs every 4 hours as needed      Cetirizine HCl (ZYRTEC ALLERGY) 10 MG CAPS Take 10 mg by mouth nightly       vitamin D (CHOLECALCIFEROL) 25 MCG (1000 UT) TABS tablet Take by mouth daily      fluticasone (FLONASE) 50 MCG/ACT nasal spray 2 sprays by Nasal route daily      levonorgestrel (MIRENA) IUD 52 mg 1 Device by IntraUTERine route once      loratadine (CLARITIN) 10 MG tablet Take 10 mg by mouth daily       melatonin 5 MG TABS tablet Take 10 mg by mouth nightly       acyclovir (ZOVIRAX) 800 MG tablet Take 1 tablet by mouth 2 times daily TAKE 1 TABLET BY MOUTH TWICE A DAY FOR 10 DAYS (Patient not taking: Reported on 12/16/2022) 20 tablet 11     No current facility-administered medications for this visit. Ms. Rick Martinez History     Socioeconomic History    Marital status:      Spouse name: None    Number of children: None    Years of education: None    Highest education level: None   Tobacco Use    Smoking status: Never    Smokeless tobacco: Never   Vaping Use    Vaping Use: Never used   Substance and Sexual Activity    Alcohol use: Yes     Comment: ocassional    Drug use: Never       Ms. Aramis Bolden   Family History   Problem Relation Age of Onset    Cancer Paternal Grandfather         leukemia    Stroke Maternal Grandfather     Lung Disease Maternal Grandfather         copd    Dementia Maternal Grandmother     Psychiatric Disorder Mother         bipolar/schizophrenia    Cancer Father         melanoma    Hypertension Father     Cancer Paternal Grandmother         lung            Ms. Aramis Bolden  has the following allergies:    Allergies   Allergen Reactions    Flurbiprofen Other (See Comments)     donated kidney    Red Dye Hives     #40       /65   Pulse 82   Temp (!) 96.6 °F (35.9 °C) (Temporal)   Resp 18   Ht 5' 7\" (1.702 m)   Wt 209 lb 3.2 oz (94.9 kg)   SpO2 97%   BMI 32.77 kg/m²     HEENT: Normocephalic, atraumatic, pupils equal and reactive to light. Neck: Supple, no masses or thyromegaly. Lungs: clear to auscultation bilaterally. CV: regular rate and rhythm, without murmurs, rubs, or gallops  Abdomen: soft, nontender, nondistended, no masses. Ext: No lower extremity edema. Estuardo Bacon was seen today for weight loss and insomnia. Diagnoses and all orders for this visit:    Class 1 obesity due to excess calories with serious comorbidity and body mass index (BMI) of 32.0 to 32.9 in adult  -     Semaglutide,0.25 or 0.5MG/DOS, (OZEMPIC, 0.25 OR 0.5 MG/DOSE,) 2 MG/1.5ML SOPN; 0.25 mg subQ once weekly  -     Insulin Pen Needle 31G X 5 MM MISC; Use one weekly with ozempic    Primary insomnia  -     traZODone (DESYREL) 150 MG tablet; Take 1 tablet by mouth nightly Takes 1\3 pill at night 50 mg    Class 1 obesity due to excess calories without serious comorbidity with body mass index (BMI) of 32.0 to 32.9 in adult  -     Semaglutide,0.25 or 0.5MG/DOS, (OZEMPIC, 0.25 OR 0.5 MG/DOSE,) 2 MG/1.5ML SOPN; 0.25 mg subQ once weekly  -     Insulin Pen Needle 31G X 5 MM MISC; Use one weekly with ozempic        Ozempic/Wegovy given and gone over. She will try this injection once weekly. She will follow-up with me in 1 month. Side effects and risks were given and gone over. She can come here for us to instruct her on how to do it. She will call for problems. We did also talk about [facility] is a possibility and she is made aware that it is not FDA approved for weight loss but I would be willing to have her try it if the [address] [facility] is unaffordable or not approved. She will continue working on diet and exercise.   Stephen Pugh MD

## 2023-01-13 ENCOUNTER — TELEPHONE (OUTPATIENT)
Dept: FAMILY MEDICINE CLINIC | Facility: CLINIC | Age: 48
End: 2023-01-13

## 2023-01-18 ENCOUNTER — TELEPHONE (OUTPATIENT)
Dept: FAMILY MEDICINE CLINIC | Facility: CLINIC | Age: 48
End: 2023-01-18

## 2023-01-25 ENCOUNTER — TELEPHONE (OUTPATIENT)
Dept: FAMILY MEDICINE CLINIC | Facility: CLINIC | Age: 48
End: 2023-01-25

## 2023-01-25 NOTE — TELEPHONE ENCOUNTER
[employer] does not handle this review. Please contact [employer] at [phone].       Waited for 45 min to speak to rep with no answer

## 2023-02-06 ENCOUNTER — TELEPHONE (OUTPATIENT)
Dept: ORTHOPEDIC SURGERY | Age: 48
End: 2023-02-06

## 2023-02-06 ENCOUNTER — CLINICAL DOCUMENTATION (OUTPATIENT)
Dept: ORTHOPEDIC SURGERY | Age: 48
End: 2023-02-06

## 2023-02-09 ENCOUNTER — OFFICE VISIT (OUTPATIENT)
Dept: ORTHOPEDIC SURGERY | Age: 48
End: 2023-02-09

## 2023-02-09 DIAGNOSIS — M25.571 PAIN IN RIGHT ANKLE AND JOINTS OF RIGHT FOOT: Primary | ICD-10-CM

## 2023-02-09 RX ORDER — METHYLPREDNISOLONE 4 MG/1
TABLET ORAL
Qty: 1 KIT | Refills: 0 | Status: SHIPPED | OUTPATIENT
Start: 2023-02-09 | End: 2023-02-15

## 2023-02-09 NOTE — PROGRESS NOTES
Name: Radha Brown  YOB: 1975  Gender: female  MRN: [id number]    Summary: Right ankle sprain potential small distal fibular fracture. NO XR needed @ Next Visit     CC: right ankle pain    HPI: Radha Brown is a 52 y.o. female who on 6/1/2022 underwent right ankle arthroscopy for an posterior lateral talus osteochondral lesion, lateral ankle reconstruction, peroneal tendon tenosynovectomy. There is significant chondral fissuring noted. Postoperatively she has done very well. 2/9/23 - she presents today w/ new ankle pain after falling down the stairs last week. She states that initially the ankle, the right ankle, was hurting and has gotten better but she still has pain and swelling. She is concerned she may have damaged her repair. 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 Since then it has gotten better but it still hurts swollen and she has edema and ecchymoses. ROS/Meds/PSH/PMH/FH/SH: I personally reviewed the patients standard intake form. Below are the pertinents    Tobacco:  reports that she has never smoked. She has never used smokeless tobacco.  Diabetes: None  No results found for: LABA1C  No results found for: EAG  Other: none    Physical Examination:  right lower: TTP . +silt s/s/sp/dp/t. 5/5 strength to EHL/FHL/AT/PT/Jordan/Achilles. toes wwp w/ BCR <2s. No open wounds. No pain with calf squeeze. TTP @ distall fibula and here is some medial and lateral ecchymosis  normal silverskoid exam: With the hindfoot in neutral and forefoot supinated there is good ankle dorsiflexion with the knee flexed and extended. Neutral hindfoot alignment.   No cavovarus nor planovalgus foot deformity  Talar tilt exam : normal  Anterior drawer exam w/ ankle plantarflexed at 20 deg: normal    Neuro:  normal SILT to s/s/sp/dp/t. Reflexes normal: 1+ patella reflex bilaterally, 1+ achilles reflex bilaterally, negative babinski bilaterally. no signs of hyper reflexia or absent reflex    Vascular: Bilateral DP and PT: dorsalis pedis 4/4    Imaging:   I independently interpreted XR taken today,   X-Ray RIGHT Ankle 3 vw (AP/Lateral/Oblique) for ankle pain   Findings: No signs of displacement of the mortise. Medial side shows no significant injuries. There is a distal fibular avulsion fracture noted. Swelling and diffuse edema noted on the lateral ankle. Impression:  Stable ankle with distal fibular avulsion fracture   Signature: Hannah Zhang MD   , and   X-Ray RIGHT Foot 2 vw (AP/Oblique) for foot pain   Findings: No signs of fracture or dislocations. The TMT joints are aligned, there are no signs of neoplastic disease, or chronic disease. The midtarsal and subtalar joints appear normal.   Impression:  Normal foot   Signature: Hannah Zhang MD        Assessment:   Acute on chronic lateral ankle injury-potential new distal fibula fracture    Plan:   4 This is a chronic illness/condition with exacerbation and progression  Treatment at this time: Prescription Drug Management and ASO brace  Studies ordered: NO XR needed @ Next Visit    Weight-bearing status: WBAT        Return to work/work restrictions: none  Medrol Dose pack (6 day oral taper): I discussed medrol being a steroid and how it can elevate blood sugars. I recommended to monitor sugars closely and to beware of symptoms such as lethargy, excessive thirst, polyuria, and GI distress. We also discussed the dose pack taper format and how long term steroid use can alter adrenal function. I also discussed steroids effect on depression and mood. How in some people it can exacerbate underlying depression while in others create a more manic response.      Julito Simeon MD    Past Medical History:   Diagnosis Date Allergic rhinitis due to pollen     Arthritis     knees  (fracture)    Cancer (HCC)     basal cell skin cancer chest and forehead     DDD (degenerative disc disease), lumbar     Depressed     History of kidney donation     Hypothyroidism due to acquired atrophy of thyroid 9/16/2015    Thyroid disease     hypo- on meds         Current Outpatient Medications:     Tirzepatide (MOUNJARO) 2.5 MG/0.5ML SOPN SC injection, Inject 0.5 mLs into the skin once a week, Disp: 4 Adjustable Dose Pre-filled Pen Syringe, Rfl: 11    Semaglutide,0.25 or 0.5MG/DOS, (OZEMPIC, 0.25 OR 0.5 MG/DOSE,) 2 MG/1.5ML SOPN, 0.25 mg subQ once weekly, Disp: 4 Adjustable Dose Pre-filled Pen Syringe, Rfl: 5    Insulin Pen Needle 31G X 5 MM MISC, Use one weekly with ozempic, Disp: 12 each, Rfl: 3    traZODone (DESYREL) 150 MG tablet, One po qhs, Disp: 90 tablet, Rfl: 3    gabapentin (NEURONTIN) 100 MG capsule, TAKE 1 CAPSULE BY MOUTH NIGHTLY, Disp: 30 capsule, Rfl: 0    sertraline (ZOLOFT) 100 MG tablet, One and a half tabs daily, Disp: 135 tablet, Rfl: 3    buPROPion (WELLBUTRIN XL) 150 MG extended release tablet, TAKE 1 TABLET BY MOUTH ONCE DAILY IN THE MORNING, Disp: 90 tablet, Rfl: 3    levothyroxine (EUTHYROX) 150 MCG tablet, TAKE 1 TABLET BY MOUTH ONCE DAILY BEFORE BREAKFAST, Disp: 30 tablet, Rfl: 11    acyclovir (ZOVIRAX) 800 MG tablet, Take 1 tablet by mouth 2 times daily TAKE 1 TABLET BY MOUTH TWICE A DAY FOR 10 DAYS (Patient not taking: Reported on 12/16/2022), Disp: 20 tablet, Rfl: 11    Multiple Vitamin (MULTIVITAMIN ADULT PO), Take by mouth nightly, Disp: , Rfl:     URINARY HEALTH/CRANBERRY PO, Take 1 tablet by mouth daily, Disp: , Rfl:     Biotin 5000 MCG TABS, Take 1 tablet by mouth daily, Disp: , Rfl:     albuterol sulfate  (90 Base) MCG/ACT inhaler, Inhale 1 puff into the lungs every 4 hours as needed, Disp: , Rfl:     Cetirizine HCl (ZYRTEC ALLERGY) 10 MG CAPS, Take 10 mg by mouth nightly , Disp: , Rfl:     vitamin D (CHOLECALCIFEROL) 25 MCG (1000 UT) TABS tablet, Take by mouth daily, Disp: , Rfl:     fluticasone (FLONASE) 50 MCG/ACT nasal spray, 2 sprays by Nasal route daily, Disp: , Rfl:     levonorgestrel (MIRENA) IUD 52 mg, 1 Device by IntraUTERine route once, Disp: , Rfl:     loratadine (CLARITIN) 10 MG tablet, Take 10 mg by mouth daily , Disp: , Rfl:     melatonin 5 MG TABS tablet, Take 10 mg by mouth nightly , Disp: , Rfl:

## 2023-06-30 ENCOUNTER — TELEPHONE (OUTPATIENT)
Dept: FAMILY MEDICINE CLINIC | Facility: CLINIC | Age: 48
End: 2023-06-30

## 2023-06-30 DIAGNOSIS — Z13.6 SCREENING FOR CARDIOVASCULAR CONDITION: ICD-10-CM

## 2023-06-30 DIAGNOSIS — Z13.0 SCREENING, ANEMIA, DEFICIENCY, IRON: ICD-10-CM

## 2023-06-30 DIAGNOSIS — E03.9 ACQUIRED HYPOTHYROIDISM: Primary | ICD-10-CM

## 2023-06-30 DIAGNOSIS — Z13.1 SCREENING FOR DIABETES MELLITUS: ICD-10-CM

## 2023-07-10 ASSESSMENT — PATIENT HEALTH QUESTIONNAIRE - PHQ9
6. FEELING BAD ABOUT YOURSELF - OR THAT YOU ARE A FAILURE OR HAVE LET YOURSELF OR YOUR FAMILY DOWN: 0
9. THOUGHTS THAT YOU WOULD BE BETTER OFF DEAD, OR OF HURTING YOURSELF: 0
10. IF YOU CHECKED OFF ANY PROBLEMS, HOW DIFFICULT HAVE THESE PROBLEMS MADE IT FOR YOU TO DO YOUR WORK, TAKE CARE OF THINGS AT HOME, OR GET ALONG WITH OTHER PEOPLE: NOT DIFFICULT AT ALL
9. THOUGHTS THAT YOU WOULD BE BETTER OFF DEAD, OR OF HURTING YOURSELF: NOT AT ALL
SUM OF ALL RESPONSES TO PHQ9 QUESTIONS 1 & 2: 2
2. FEELING DOWN, DEPRESSED OR HOPELESS: 1
8. MOVING OR SPEAKING SO SLOWLY THAT OTHER PEOPLE COULD HAVE NOTICED. OR THE OPPOSITE, BEING SO FIGETY OR RESTLESS THAT YOU HAVE BEEN MOVING AROUND A LOT MORE THAN USUAL: 0
7. TROUBLE CONCENTRATING ON THINGS, SUCH AS READING THE NEWSPAPER OR WATCHING TELEVISION: 0
3. TROUBLE FALLING OR STAYING ASLEEP: 2
2. FEELING DOWN, DEPRESSED OR HOPELESS: SEVERAL DAYS
10. IF YOU CHECKED OFF ANY PROBLEMS, HOW DIFFICULT HAVE THESE PROBLEMS MADE IT FOR YOU TO DO YOUR WORK, TAKE CARE OF THINGS AT HOME, OR GET ALONG WITH OTHER PEOPLE: 0
SUM OF ALL RESPONSES TO PHQ QUESTIONS 1-9: 5
5. POOR APPETITE OR OVEREATING: 0
7. TROUBLE CONCENTRATING ON THINGS, SUCH AS READING THE NEWSPAPER OR WATCHING TELEVISION: NOT AT ALL
3. TROUBLE FALLING OR STAYING ASLEEP: MORE THAN HALF THE DAYS
4. FEELING TIRED OR HAVING LITTLE ENERGY: SEVERAL DAYS
SUM OF ALL RESPONSES TO PHQ QUESTIONS 1-9: 5
5. POOR APPETITE OR OVEREATING: NOT AT ALL
SUM OF ALL RESPONSES TO PHQ QUESTIONS 1-9: 5
6. FEELING BAD ABOUT YOURSELF - OR THAT YOU ARE A FAILURE OR HAVE LET YOURSELF OR YOUR FAMILY DOWN: NOT AT ALL
8. MOVING OR SPEAKING SO SLOWLY THAT OTHER PEOPLE COULD HAVE NOTICED. OR THE OPPOSITE - BEING SO FIDGETY OR RESTLESS THAT YOU HAVE BEEN MOVING AROUND A LOT MORE THAN USUAL: NOT AT ALL
1. LITTLE INTEREST OR PLEASURE IN DOING THINGS: 1
SUM OF ALL RESPONSES TO PHQ QUESTIONS 1-9: 5
1. LITTLE INTEREST OR PLEASURE IN DOING THINGS: SEVERAL DAYS
4. FEELING TIRED OR HAVING LITTLE ENERGY: 1
SUM OF ALL RESPONSES TO PHQ QUESTIONS 1-9: 5

## 2023-07-11 ENCOUNTER — OFFICE VISIT (OUTPATIENT)
Dept: FAMILY MEDICINE CLINIC | Facility: CLINIC | Age: 48
End: 2023-07-11
Payer: COMMERCIAL

## 2023-07-11 VITALS
HEIGHT: 67 IN | SYSTOLIC BLOOD PRESSURE: 120 MMHG | DIASTOLIC BLOOD PRESSURE: 81 MMHG | OXYGEN SATURATION: 98 % | RESPIRATION RATE: 16 BRPM | BODY MASS INDEX: 29.51 KG/M2 | TEMPERATURE: 97 F | WEIGHT: 188 LBS | HEART RATE: 71 BPM

## 2023-07-11 DIAGNOSIS — F33.42 RECURRENT MAJOR DEPRESSIVE DISORDER, IN FULL REMISSION (HCC): ICD-10-CM

## 2023-07-11 DIAGNOSIS — F51.01 PRIMARY INSOMNIA: ICD-10-CM

## 2023-07-11 DIAGNOSIS — B00.1 FEVER BLISTER: ICD-10-CM

## 2023-07-11 DIAGNOSIS — E66.09 CLASS 1 OBESITY DUE TO EXCESS CALORIES WITHOUT SERIOUS COMORBIDITY WITH BODY MASS INDEX (BMI) OF 32.0 TO 32.9 IN ADULT: ICD-10-CM

## 2023-07-11 DIAGNOSIS — Z00.00 ROUTINE GENERAL MEDICAL EXAMINATION AT A HEALTH CARE FACILITY: Primary | ICD-10-CM

## 2023-07-11 DIAGNOSIS — M54.50 CHRONIC BILATERAL LOW BACK PAIN WITHOUT SCIATICA: ICD-10-CM

## 2023-07-11 DIAGNOSIS — E66.09 CLASS 1 OBESITY DUE TO EXCESS CALORIES WITH SERIOUS COMORBIDITY AND BODY MASS INDEX (BMI) OF 32.0 TO 32.9 IN ADULT: ICD-10-CM

## 2023-07-11 DIAGNOSIS — Z90.5 H/O KIDNEY DONATION: ICD-10-CM

## 2023-07-11 DIAGNOSIS — Z13.6 SCREENING FOR CARDIOVASCULAR CONDITION: ICD-10-CM

## 2023-07-11 DIAGNOSIS — G43.009 MIGRAINE WITHOUT AURA AND WITHOUT STATUS MIGRAINOSUS, NOT INTRACTABLE: ICD-10-CM

## 2023-07-11 DIAGNOSIS — Z12.11 SCREENING FOR MALIGNANT NEOPLASM OF COLON: ICD-10-CM

## 2023-07-11 DIAGNOSIS — Z13.1 SCREENING FOR DIABETES MELLITUS: ICD-10-CM

## 2023-07-11 DIAGNOSIS — E03.9 ACQUIRED HYPOTHYROIDISM: ICD-10-CM

## 2023-07-11 DIAGNOSIS — M51.36 DDD (DEGENERATIVE DISC DISEASE), LUMBAR: ICD-10-CM

## 2023-07-11 DIAGNOSIS — Z13.0 SCREENING, ANEMIA, DEFICIENCY, IRON: ICD-10-CM

## 2023-07-11 DIAGNOSIS — G89.29 CHRONIC BILATERAL LOW BACK PAIN WITHOUT SCIATICA: ICD-10-CM

## 2023-07-11 LAB
BASOPHILS # BLD: 0 K/UL (ref 0–0.2)
BASOPHILS NFR BLD: 0 % (ref 0–2)
DIFFERENTIAL METHOD BLD: ABNORMAL
EOSINOPHIL # BLD: 0 K/UL (ref 0–0.8)
EOSINOPHIL NFR BLD: 0 % (ref 0.5–7.8)
ERYTHROCYTE [DISTWIDTH] IN BLOOD BY AUTOMATED COUNT: 13.3 % (ref 11.9–14.6)
HCT VFR BLD AUTO: 43.5 % (ref 35.8–46.3)
HGB BLD-MCNC: 14.4 G/DL (ref 11.7–15.4)
IMM GRANULOCYTES # BLD AUTO: 0 K/UL (ref 0–0.5)
IMM GRANULOCYTES NFR BLD AUTO: 0 % (ref 0–5)
LYMPHOCYTES # BLD: 2 K/UL (ref 0.5–4.6)
LYMPHOCYTES NFR BLD: 35 % (ref 13–44)
MCH RBC QN AUTO: 31.2 PG (ref 26.1–32.9)
MCHC RBC AUTO-ENTMCNC: 33.1 G/DL (ref 31.4–35)
MCV RBC AUTO: 94.2 FL (ref 82–102)
MONOCYTES # BLD: 0.5 K/UL (ref 0.1–1.3)
MONOCYTES NFR BLD: 9 % (ref 4–12)
NEUTS SEG # BLD: 3.1 K/UL (ref 1.7–8.2)
NEUTS SEG NFR BLD: 56 % (ref 43–78)
NRBC # BLD: 0 K/UL (ref 0–0.2)
PLATELET # BLD AUTO: 213 K/UL (ref 150–450)
PMV BLD AUTO: 12.3 FL (ref 9.4–12.3)
RBC # BLD AUTO: 4.62 M/UL (ref 4.05–5.2)
WBC # BLD AUTO: 5.6 K/UL (ref 4.3–11.1)

## 2023-07-11 PROCEDURE — 99396 PREV VISIT EST AGE 40-64: CPT | Performed by: FAMILY MEDICINE

## 2023-07-11 RX ORDER — TRAZODONE HYDROCHLORIDE 150 MG/1
TABLET ORAL
Qty: 90 TABLET | Refills: 3 | Status: SHIPPED | OUTPATIENT
Start: 2023-07-11

## 2023-07-11 RX ORDER — ACYCLOVIR 800 MG/1
800 TABLET ORAL 2 TIMES DAILY
Qty: 20 TABLET | Refills: 11 | Status: SHIPPED | OUTPATIENT
Start: 2023-07-11

## 2023-07-11 RX ORDER — LEVOTHYROXINE SODIUM 0.15 MG/1
TABLET ORAL
Qty: 30 TABLET | Refills: 11 | Status: SHIPPED | OUTPATIENT
Start: 2023-07-11 | End: 2023-07-12 | Stop reason: SDUPTHER

## 2023-07-11 RX ORDER — BUPROPION HYDROCHLORIDE 150 MG/1
TABLET ORAL
Qty: 90 TABLET | Refills: 3 | Status: SHIPPED | OUTPATIENT
Start: 2023-07-11

## 2023-07-11 RX ORDER — GABAPENTIN 100 MG/1
100 CAPSULE ORAL NIGHTLY
Qty: 90 CAPSULE | Refills: 3 | Status: SHIPPED | OUTPATIENT
Start: 2023-07-11 | End: 2024-07-10

## 2023-07-11 RX ORDER — SERTRALINE HYDROCHLORIDE 100 MG/1
TABLET, FILM COATED ORAL
Qty: 135 TABLET | Refills: 3 | Status: SHIPPED | OUTPATIENT
Start: 2023-07-11

## 2023-07-11 RX ORDER — FROVATRIPTAN SUCCINATE 2.5 MG/1
2.5 TABLET, FILM COATED ORAL PRN
Qty: 30 TABLET | Refills: 3 | Status: SHIPPED | OUTPATIENT
Start: 2023-07-11

## 2023-07-11 NOTE — PROGRESS NOTES
tenderness. Ext: No lower extremity edema. No lesions about the feet. Breasts: no masses, no nipple discharge, no axillary lymphadenopathy of either breast.  Pelvic: deferred  Skin: no lesions. Surya Leal was seen today for annual exam.    Diagnoses and all orders for this visit:    Routine general medical examination at a health care facility    Migraine without aura and without status migrainosus, not intractable  -     frovatriptan succinate (FROVA) 2.5 MG TABS; Take 1 tablet by mouth as needed for Migraine    DDD (degenerative disc disease), lumbar  -     gabapentin (NEURONTIN) 100 MG capsule; Take 1 capsule by mouth nightly. Primary insomnia  -     traZODone (DESYREL) 150 MG tablet; One po qhs    Class 1 obesity due to excess calories with serious comorbidity and body mass index (BMI) of 32.0 to 32.9 in adult    Class 1 obesity due to excess calories without serious comorbidity with body mass index (BMI) of 32.0 to 32.9 in adult    Acquired hypothyroidism  -     levothyroxine (EUTHYROX) 150 MCG tablet; TAKE 1 TABLET BY MOUTH ONCE DAILY BEFORE BREAKFAST    Recurrent major depressive disorder, in full remission (HCC)  -     buPROPion (WELLBUTRIN XL) 150 MG extended release tablet; TAKE 1 TABLET BY MOUTH ONCE DAILY IN THE MORNING  -     sertraline (ZOLOFT) 100 MG tablet; One and a half tabs daily    Chronic bilateral low back pain without sciatica  -     gabapentin (NEURONTIN) 100 MG capsule; Take 1 capsule by mouth nightly. Fever blister  -     acyclovir (ZOVIRAX) 800 MG tablet; Take 1 tablet by mouth 2 times daily TAKE 1 TABLET BY MOUTH TWICE A DAY FOR 10 DAYS    Screening for malignant neoplasm of colon  -     External Referral To Gastroenterology    H/O kidney donation        30 minutes of moderate aerobic exercise 5 days a week and green leafy vegetables daily for cardiovascular risk reduction.       Melecio Hdz MD

## 2023-07-12 LAB
ALBUMIN SERPL-MCNC: 3.8 G/DL (ref 3.5–5)
ALBUMIN/GLOB SERPL: 1.1 (ref 0.4–1.6)
ALP SERPL-CCNC: 80 U/L (ref 50–136)
ALT SERPL-CCNC: 43 U/L (ref 12–65)
ANION GAP SERPL CALC-SCNC: 5 MMOL/L (ref 2–11)
AST SERPL-CCNC: 22 U/L (ref 15–37)
BILIRUB SERPL-MCNC: 0.4 MG/DL (ref 0.2–1.1)
BUN SERPL-MCNC: 10 MG/DL (ref 6–23)
CALCIUM SERPL-MCNC: 9 MG/DL (ref 8.3–10.4)
CHLORIDE SERPL-SCNC: 106 MMOL/L (ref 101–110)
CHOLEST SERPL-MCNC: 159 MG/DL
CO2 SERPL-SCNC: 24 MMOL/L (ref 21–32)
CREAT SERPL-MCNC: 0.9 MG/DL (ref 0.6–1)
GLOBULIN SER CALC-MCNC: 3.6 G/DL (ref 2.8–4.5)
GLUCOSE SERPL-MCNC: 87 MG/DL (ref 65–100)
HDLC SERPL-MCNC: 40 MG/DL (ref 40–60)
HDLC SERPL: 4
LDLC SERPL CALC-MCNC: 66.4 MG/DL
POTASSIUM SERPL-SCNC: 3.9 MMOL/L (ref 3.5–5.1)
PROT SERPL-MCNC: 7.4 G/DL (ref 6.3–8.2)
SODIUM SERPL-SCNC: 135 MMOL/L (ref 133–143)
TRIGL SERPL-MCNC: 263 MG/DL (ref 35–150)
TSH, 3RD GENERATION: 0.19 UIU/ML (ref 0.36–3.74)
VLDLC SERPL CALC-MCNC: 52.6 MG/DL (ref 6–23)

## 2023-07-12 RX ORDER — LEVOTHYROXINE SODIUM 137 UG/1
TABLET ORAL
Qty: 90 TABLET | Refills: 0 | Status: SHIPPED | OUTPATIENT
Start: 2023-07-12

## 2023-07-12 NOTE — RESULT ENCOUNTER NOTE
Let patient know labs normal except her thyroid blood work is slightly out of range indicating that she is on a little bit too much thyroid hormone. I would like to decrease her dose to 137 mcg and recheck her labs in 3 months. The rest of her blood work is good other than her triglycerides were slightly high at 263. Really work on being careful about sweets and sugars in her diet.

## 2023-07-21 ENCOUNTER — OFFICE VISIT (OUTPATIENT)
Dept: FAMILY MEDICINE CLINIC | Facility: CLINIC | Age: 48
End: 2023-07-21
Payer: COMMERCIAL

## 2023-07-21 VITALS
WEIGHT: 186.6 LBS | SYSTOLIC BLOOD PRESSURE: 122 MMHG | HEIGHT: 67 IN | HEART RATE: 80 BPM | BODY MASS INDEX: 29.29 KG/M2 | DIASTOLIC BLOOD PRESSURE: 82 MMHG | RESPIRATION RATE: 16 BRPM | TEMPERATURE: 97.5 F | OXYGEN SATURATION: 98 %

## 2023-07-21 DIAGNOSIS — F41.9 ANXIETY: Primary | ICD-10-CM

## 2023-07-21 PROCEDURE — 99212 OFFICE O/P EST SF 10 MIN: CPT | Performed by: NURSE PRACTITIONER

## 2023-07-21 ASSESSMENT — ENCOUNTER SYMPTOMS
NAUSEA: 1
RESPIRATORY NEGATIVE: 1

## 2023-07-21 NOTE — PROGRESS NOTES
and Perception: Attention normal.         Mood and Affect: Mood is anxious. Speech: Speech is rapid and pressured. Behavior: Behavior is cooperative. Thought Content: Thought content does not include suicidal ideation. Thought content does not include homicidal or suicidal plan. She is scheduled to see Marriage counseler Kaley Gibson in [place] . Appointment made with  [facility] with [facility]. Instructed to not increase trazadone or other medications at this time. She can decrease the trazodone to see if that helps. To keep a chart of nights she wakes up. Will make appointment after she sees behavior therapy . She is not having suicidal ideations           Marriage counseler Rocio Graff counseler in [place]   An electronic signature was used to authenticate this note.     --BECKY Vragas - NP

## 2023-08-09 ENCOUNTER — OFFICE VISIT (OUTPATIENT)
Dept: FAMILY MEDICINE CLINIC | Facility: CLINIC | Age: 48
End: 2023-08-09
Payer: COMMERCIAL

## 2023-08-09 VITALS
RESPIRATION RATE: 16 BRPM | DIASTOLIC BLOOD PRESSURE: 75 MMHG | TEMPERATURE: 97.2 F | WEIGHT: 178 LBS | HEIGHT: 67 IN | HEART RATE: 103 BPM | BODY MASS INDEX: 27.94 KG/M2 | SYSTOLIC BLOOD PRESSURE: 112 MMHG | OXYGEN SATURATION: 98 %

## 2023-08-09 DIAGNOSIS — F41.0 PANIC ATTACK: ICD-10-CM

## 2023-08-09 DIAGNOSIS — F33.42 RECURRENT MAJOR DEPRESSIVE DISORDER, IN FULL REMISSION (HCC): Primary | ICD-10-CM

## 2023-08-09 PROCEDURE — 99214 OFFICE O/P EST MOD 30 MIN: CPT | Performed by: FAMILY MEDICINE

## 2023-08-09 RX ORDER — HYDROXYZINE HYDROCHLORIDE 25 MG/1
TABLET, FILM COATED ORAL
Qty: 30 TABLET | Refills: 0 | Status: SHIPPED | OUTPATIENT
Start: 2023-08-09

## 2023-08-09 RX ORDER — SERTRALINE HYDROCHLORIDE 100 MG/1
TABLET, FILM COATED ORAL
Qty: 180 TABLET | Refills: 3
Start: 2023-08-09

## 2023-08-09 ASSESSMENT — PATIENT HEALTH QUESTIONNAIRE - PHQ9
SUM OF ALL RESPONSES TO PHQ QUESTIONS 1-9: 16
7. TROUBLE CONCENTRATING ON THINGS, SUCH AS READING THE NEWSPAPER OR WATCHING TELEVISION: 1
4. FEELING TIRED OR HAVING LITTLE ENERGY: 3
SUM OF ALL RESPONSES TO PHQ QUESTIONS 1-9: 16
2. FEELING DOWN, DEPRESSED OR HOPELESS: 3
SUM OF ALL RESPONSES TO PHQ9 QUESTIONS 1 & 2: 6
10. IF YOU CHECKED OFF ANY PROBLEMS, HOW DIFFICULT HAVE THESE PROBLEMS MADE IT FOR YOU TO DO YOUR WORK, TAKE CARE OF THINGS AT HOME, OR GET ALONG WITH OTHER PEOPLE: 1
8. MOVING OR SPEAKING SO SLOWLY THAT OTHER PEOPLE COULD HAVE NOTICED. OR THE OPPOSITE, BEING SO FIGETY OR RESTLESS THAT YOU HAVE BEEN MOVING AROUND A LOT MORE THAN USUAL: 0
6. FEELING BAD ABOUT YOURSELF - OR THAT YOU ARE A FAILURE OR HAVE LET YOURSELF OR YOUR FAMILY DOWN: 3
1. LITTLE INTEREST OR PLEASURE IN DOING THINGS: 3
SUM OF ALL RESPONSES TO PHQ QUESTIONS 1-9: 16
9. THOUGHTS THAT YOU WOULD BE BETTER OFF DEAD, OR OF HURTING YOURSELF: 0
5. POOR APPETITE OR OVEREATING: 3
SUM OF ALL RESPONSES TO PHQ QUESTIONS 1-9: 16
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
## 2023-08-29 ENCOUNTER — OFFICE VISIT (OUTPATIENT)
Dept: FAMILY MEDICINE CLINIC | Facility: CLINIC | Age: 48
End: 2023-08-29
Payer: COMMERCIAL

## 2023-08-29 VITALS
OXYGEN SATURATION: 96 % | TEMPERATURE: 97.5 F | WEIGHT: 184.7 LBS | HEIGHT: 67 IN | DIASTOLIC BLOOD PRESSURE: 76 MMHG | SYSTOLIC BLOOD PRESSURE: 120 MMHG | BODY MASS INDEX: 28.99 KG/M2 | HEART RATE: 77 BPM | RESPIRATION RATE: 16 BRPM

## 2023-08-29 DIAGNOSIS — R30.9 URINARY PAIN: ICD-10-CM

## 2023-08-29 DIAGNOSIS — R05.9 COUGH, UNSPECIFIED TYPE: Primary | ICD-10-CM

## 2023-08-29 DIAGNOSIS — N39.0 URINARY TRACT INFECTION WITHOUT HEMATURIA, SITE UNSPECIFIED: ICD-10-CM

## 2023-08-29 DIAGNOSIS — U07.1 COVID-19: ICD-10-CM

## 2023-08-29 LAB
BILIRUBIN, URINE, POC: NEGATIVE
BLOOD URINE, POC: NORMAL
EXP DATE SOLUTION: NORMAL
EXP DATE SWAB: NORMAL
EXPIRATION DATE: NORMAL
GLUCOSE URINE, POC: NEGATIVE
KETONES, URINE, POC: NEGATIVE
LEUKOCYTE ESTERASE, URINE, POC: NORMAL
LOT NUMBER POC: NORMAL
LOT NUMBER SOLUTION: NORMAL
LOT NUMBER SWAB: NORMAL
NITRITE, URINE, POC: NEGATIVE
PH, URINE, POC: 6 (ref 4.6–8)
PROTEIN,URINE, POC: NORMAL
SARS-COV-2 RNA, POC: POSITIVE
SPECIFIC GRAVITY, URINE, POC: 1.01 (ref 1–1.03)
URINALYSIS CLARITY, POC: NORMAL
URINALYSIS COLOR, POC: YELLOW
UROBILINOGEN, POC: NORMAL

## 2023-08-29 PROCEDURE — 87635 SARS-COV-2 COVID-19 AMP PRB: CPT | Performed by: PHYSICIAN ASSISTANT

## 2023-08-29 PROCEDURE — 81003 URINALYSIS AUTO W/O SCOPE: CPT | Performed by: PHYSICIAN ASSISTANT

## 2023-08-29 PROCEDURE — 99214 OFFICE O/P EST MOD 30 MIN: CPT | Performed by: PHYSICIAN ASSISTANT

## 2023-08-29 RX ORDER — AMOXICILLIN AND CLAVULANATE POTASSIUM 875; 125 MG/1; MG/1
1 TABLET, FILM COATED ORAL 2 TIMES DAILY
Qty: 20 TABLET | Refills: 0 | Status: SHIPPED | OUTPATIENT
Start: 2023-08-29 | End: 2023-09-08

## 2023-08-29 ASSESSMENT — ENCOUNTER SYMPTOMS
WHEEZING: 0
TROUBLE SWALLOWING: 0
EYE DISCHARGE: 0
SINUS PRESSURE: 1
VOMITING: 0
ABDOMINAL PAIN: 0
RHINORRHEA: 1
SHORTNESS OF BREATH: 0
DIARRHEA: 0
BACK PAIN: 0
NAUSEA: 0
COUGH: 1
CHEST TIGHTNESS: 0
SORE THROAT: 0

## 2023-08-29 NOTE — PROGRESS NOTES
[facility] [address] [facility]  [address] [facility]  [address] [facility], [address] [place] [address] Road  Phone [phone]      Patient: Jorgito Park  YOB: 1975  Age 52 y.o. Sex female  Medical Record:  [id number]  Visit Date: 08/29/23  Author:  Malvin Becker PA-C    Family Practice Clinic Note    Chief Complaint   Patient presents with    Cough    Congestion    Otalgia    Urinary Pain     cloudy, freq. History of Present Illness  This is a 63-year-old female who presents today with complaints of respiratory symptoms which have been present now for 3-4 days. She has had some intermittent sinus congestion and pressure for the past few weeks but her symptoms worsened in the last few days and include chest congestion, cough, fatigue and body aches with rhinorrhea and post nasal drip. She had some cough but this has improved. She states that she has felt feverish and had some chills but has not had any documented fever. She also has complaints of urinary symptoms which seemed to start this morning. She notes mild dysuria and increased frequency. She denies abdominal pain, nausea, vomiting, diarrhea. Denies back or flank pain. Denies gross hematuria.     Past History:    Past Medical history   Past Medical History:   Diagnosis Date    Allergic rhinitis due to pollen     Arthritis     knees  (fracture)    Cancer (HCC)     basal cell skin cancer chest and forehead     DDD (degenerative disc disease), lumbar     Depressed     Exercise-induced asthma     History of kidney donation     Hypothyroidism due to acquired atrophy of thyroid 09/16/2015    Thyroid disease     hypo- on meds       Current Problem List:   Patient Active Problem List   Diagnosis    Acquired hypothyroidism    H/O kidney donation    DSAP (disseminated superficial actinic porokeratosis)    Mild intermittent asthma without complication    History of basal cell carcinoma of skin    Allergic rhinitis due to pollen    Recurrent major depressive disorder,

## 2023-08-29 NOTE — PATIENT INSTRUCTIONS
*Please call if worsening symptoms such as shortness of breath, difficulty breathing, persistent fever or if feeling much worse. *Remain isolated for 5 days minimum from the start of your symptoms, with at least 48 hours without fever and your symptoms improving. *Wash hands often with soap and water for at least 20 seconds after using the restroom, before eating or when visibly dirty. Or use hand  with at least 60% alcohol content. Cover coughs and sneezes and wash your hands afterwards. *Wear a mask when around others if possible and try to stay away from others within the same household. Clean all frequently touched surfaces such as doorknobs, faucets, and cellphones multiple times a day. *Recommend Tylenol as needed for fever and body aches. *Recommended use of Mucinex DM, as needed for cough and congestion. Increase fluids and get plenty of rest.  *Appears to be a urinary tract infection. Make sure to drink plenty of water. Limit consumption of caffeine, alcohol, artificial sweeteners, tomato based products, spicy foods, and citrus while  having urinary symptoms. The consumption of these foods can change the pH of the urine and irritate the lining of the bladder, prolonging the UTI symptoms. Be sure to complete the entire course of antibiotics (Augmentin). May take Tylenol for fever or pain. May also take over the counter AZO for temporary relief of bladder spasms. It will turn the urine orange so don't be alarmed. We will notify you in a few days if the urine culture shows there is resistance to the antibiotic and to any changes that may need to be made. Return for follow up if symptoms persist or worsen.

## 2023-08-31 DIAGNOSIS — N39.0 URINARY TRACT INFECTION WITHOUT HEMATURIA, SITE UNSPECIFIED: Primary | ICD-10-CM

## 2023-08-31 LAB
BACTERIA SPEC CULT: ABNORMAL
SERVICE CMNT-IMP: ABNORMAL

## 2023-08-31 RX ORDER — FLUCONAZOLE 150 MG/1
150 TABLET ORAL DAILY
Qty: 2 TABLET | Refills: 0 | Status: SHIPPED | OUTPATIENT
Start: 2023-08-31

## 2023-08-31 RX ORDER — CIPROFLOXACIN 500 MG/1
500 TABLET, FILM COATED ORAL 2 TIMES DAILY
Qty: 10 TABLET | Refills: 0 | Status: SHIPPED | OUTPATIENT
Start: 2023-08-31 | End: 2023-09-05

## 2023-10-08 DIAGNOSIS — E03.9 ACQUIRED HYPOTHYROIDISM: ICD-10-CM

## 2023-10-09 RX ORDER — LEVOTHYROXINE SODIUM 137 UG/1
TABLET ORAL
Qty: 90 TABLET | Refills: 0 | Status: SHIPPED | OUTPATIENT
Start: 2023-10-09

## 2023-11-10 ENCOUNTER — OFFICE VISIT (OUTPATIENT)
Dept: FAMILY MEDICINE CLINIC | Facility: CLINIC | Age: 48
End: 2023-11-10
Payer: COMMERCIAL

## 2023-11-10 VITALS
OXYGEN SATURATION: 99 % | HEART RATE: 85 BPM | BODY MASS INDEX: 29.51 KG/M2 | HEIGHT: 67 IN | SYSTOLIC BLOOD PRESSURE: 117 MMHG | RESPIRATION RATE: 16 BRPM | WEIGHT: 188 LBS | DIASTOLIC BLOOD PRESSURE: 52 MMHG | TEMPERATURE: 97.3 F

## 2023-11-10 DIAGNOSIS — F41.0 PANIC ATTACK: ICD-10-CM

## 2023-11-10 DIAGNOSIS — E66.3 OVERWEIGHT: ICD-10-CM

## 2023-11-10 DIAGNOSIS — H92.02 LEFT EAR PAIN: ICD-10-CM

## 2023-11-10 DIAGNOSIS — F33.42 RECURRENT MAJOR DEPRESSIVE DISORDER, IN FULL REMISSION (HCC): Primary | ICD-10-CM

## 2023-11-10 DIAGNOSIS — G43.009 MIGRAINE WITHOUT AURA AND WITHOUT STATUS MIGRAINOSUS, NOT INTRACTABLE: ICD-10-CM

## 2023-11-10 PROBLEM — E66.811 CLASS 1 OBESITY DUE TO EXCESS CALORIES WITHOUT SERIOUS COMORBIDITY IN ADULT: Status: RESOLVED | Noted: 2022-12-16 | Resolved: 2023-11-10

## 2023-11-10 PROBLEM — E66.09 CLASS 1 OBESITY DUE TO EXCESS CALORIES WITHOUT SERIOUS COMORBIDITY IN ADULT: Status: RESOLVED | Noted: 2022-12-16 | Resolved: 2023-11-10

## 2023-11-10 PROCEDURE — 99214 OFFICE O/P EST MOD 30 MIN: CPT | Performed by: FAMILY MEDICINE

## 2023-11-10 RX ORDER — BUPROPION HYDROCHLORIDE 300 MG/1
TABLET ORAL
Qty: 30 TABLET | Refills: 3 | Status: SHIPPED | OUTPATIENT
Start: 2023-11-10

## 2023-11-10 RX ORDER — SERTRALINE HYDROCHLORIDE 100 MG/1
TABLET, FILM COATED ORAL
Qty: 180 TABLET | Refills: 3 | Status: SHIPPED | OUTPATIENT
Start: 2023-11-10

## 2023-11-10 ASSESSMENT — PATIENT HEALTH QUESTIONNAIRE - PHQ9
1. LITTLE INTEREST OR PLEASURE IN DOING THINGS: 1
SUM OF ALL RESPONSES TO PHQ9 QUESTIONS 1 & 2: 2
9. THOUGHTS THAT YOU WOULD BE BETTER OFF DEAD, OR OF HURTING YOURSELF: 1
SUM OF ALL RESPONSES TO PHQ QUESTIONS 1-9: 11
2. FEELING DOWN, DEPRESSED OR HOPELESS: 1
5. POOR APPETITE OR OVEREATING: 2
SUM OF ALL RESPONSES TO PHQ QUESTIONS 1-9: 10
10. IF YOU CHECKED OFF ANY PROBLEMS, HOW DIFFICULT HAVE THESE PROBLEMS MADE IT FOR YOU TO DO YOUR WORK, TAKE CARE OF THINGS AT HOME, OR GET ALONG WITH OTHER PEOPLE: 1
SUM OF ALL RESPONSES TO PHQ QUESTIONS 1-9: 11
4. FEELING TIRED OR HAVING LITTLE ENERGY: 2
8. MOVING OR SPEAKING SO SLOWLY THAT OTHER PEOPLE COULD HAVE NOTICED. OR THE OPPOSITE, BEING SO FIGETY OR RESTLESS THAT YOU HAVE BEEN MOVING AROUND A LOT MORE THAN USUAL: 1
7. TROUBLE CONCENTRATING ON THINGS, SUCH AS READING THE NEWSPAPER OR WATCHING TELEVISION: 1
3. TROUBLE FALLING OR STAYING ASLEEP: 1
6. FEELING BAD ABOUT YOURSELF - OR THAT YOU ARE A FAILURE OR HAVE LET YOURSELF OR YOUR FAMILY DOWN: 1
SUM OF ALL RESPONSES TO PHQ QUESTIONS 1-9: 11

## 2023-11-10 NOTE — PROGRESS NOTES
[address], [address] [facility]  Phone: [phone] Fax [phone]  Minh Packer MD  11/10/2023           Ms. Cora Sosa  is a 50y.o.  year old  female patient who comes in for follow up on depression. She says she is definitely better on the higher dose of Zoloft but certainly not where she needs to be. She is not having side effects from it. She also takes Wellbutrin 150 mg daily. She has had several nights where she was very down and did call crisis line and then call her counselor the next day and they helped her through it. She and her  are back together and are working on their marriage through counseling. No thoughts of suicide but there have been times when she just did not want to be alive. She would not act on it because of her children. She did take Ozempic and really lost weight. She would like to lose further weight however. She has also had left ear pain for months. She does feel that she has had decreased hearing there as well. Ms. Cora Sosa  has  has a past medical history of Allergic rhinitis due to pollen, Arthritis, Cancer ([address]), DDD (degenerative disc disease), lumbar, Depressed, Exercise-induced asthma, History of kidney donation, Hypothyroidism due to acquired atrophy of thyroid, and Thyroid disease. Ms. Cora Sosa  has  has a past surgical history that includes Knee arthroscopy (Bilateral, 4/20/15); orthopedic surgery (Left); gyn; Urological Surgery (2016); heent (2014); hernia repair;  section; Ankle arthroscopy (Right, 2022); and Ankle surgery ().     Ms. Cora Sosa   Current Outpatient Medications   Medication Sig Dispense Refill    sertraline (ZOLOFT) 100 MG tablet Two tabs daily 180 tablet 3    buPROPion (WELLBUTRIN XL) 300 MG extended release tablet TAKE 1 TABLET BY MOUTH ONCE DAILY IN THE MORNING 30 tablet 3    levothyroxine (SYNTHROID) 137 MCG tablet Take 1 tablet by mouth once daily 90

## 2023-11-15 ENCOUNTER — OFFICE VISIT (OUTPATIENT)
Dept: ENT CLINIC | Age: 48
End: 2023-11-15
Payer: COMMERCIAL

## 2023-11-15 VITALS
SYSTOLIC BLOOD PRESSURE: 114 MMHG | HEIGHT: 67 IN | WEIGHT: 190 LBS | BODY MASS INDEX: 29.82 KG/M2 | DIASTOLIC BLOOD PRESSURE: 72 MMHG | RESPIRATION RATE: 17 BRPM

## 2023-11-15 DIAGNOSIS — J30.9 CHRONIC ALLERGIC RHINITIS: ICD-10-CM

## 2023-11-15 DIAGNOSIS — H69.92 ETD (EUSTACHIAN TUBE DYSFUNCTION), LEFT: Primary | ICD-10-CM

## 2023-11-15 PROCEDURE — 99243 OFF/OP CNSLTJ NEW/EST LOW 30: CPT | Performed by: STUDENT IN AN ORGANIZED HEALTH CARE EDUCATION/TRAINING PROGRAM

## 2023-11-15 ASSESSMENT — ENCOUNTER SYMPTOMS
STRIDOR: 0
FACIAL SWELLING: 0
APNEA: 0
CONSTIPATION: 0
EYE PAIN: 0
SINUS PRESSURE: 0
CHOKING: 0
WHEEZING: 0
EYE ITCHING: 0
NAUSEA: 0
COUGH: 0
SINUS PAIN: 0
EYE DISCHARGE: 0
DIARRHEA: 0
SHORTNESS OF BREATH: 0

## 2023-11-15 NOTE — PROGRESS NOTES
normal.         Behavior: Behavior normal.                ASSESSMENT and PLAN        ICD-10-CM    1. ETD (Eustachian tube dysfunction), left  H69.92       2. Chronic allergic rhinitis  J30.9           Patient did have evidence of a small area of scar tissue to the tympanic membrane overlying the left-sided pars flaccida region. There was no retraction or middle ear effusion. It is my suspicion that she had an event of a left-sided TM perforation during her flight and sinusitis in August which has now resolved and well-healed. She still has some residual sensitivity and static sound to her left hearing which will most likely be self-limiting and resolve over the course of the next weeks to months. We have discussed eustachian tube dysfunction and she can utilize Flonase for long-term treatment and Afrin can be used for a day or 2 as needed during acute events in the future. These can also be helpful during flights. She will follow-up with ENT as needed.     Eloise Gresham,   11/15/2023

## 2023-11-18 DIAGNOSIS — E03.9 ACQUIRED HYPOTHYROIDISM: ICD-10-CM

## 2023-11-19 RX ORDER — LEVOTHYROXINE SODIUM 137 UG/1
TABLET ORAL
Qty: 90 TABLET | Refills: 0 | Status: SHIPPED | OUTPATIENT
Start: 2023-11-19

## 2024-03-01 ENCOUNTER — OFFICE VISIT (OUTPATIENT)
Dept: FAMILY MEDICINE CLINIC | Facility: CLINIC | Age: 49
End: 2024-03-01
Payer: COMMERCIAL

## 2024-03-01 VITALS
DIASTOLIC BLOOD PRESSURE: 66 MMHG | TEMPERATURE: 97.6 F | RESPIRATION RATE: 16 BRPM | WEIGHT: 188 LBS | OXYGEN SATURATION: 97 % | BODY MASS INDEX: 29.51 KG/M2 | HEART RATE: 80 BPM | HEIGHT: 67 IN | SYSTOLIC BLOOD PRESSURE: 120 MMHG

## 2024-03-01 DIAGNOSIS — F33.1 MODERATE EPISODE OF RECURRENT MAJOR DEPRESSIVE DISORDER (HCC): Primary | ICD-10-CM

## 2024-03-01 DIAGNOSIS — F41.0 PANIC ATTACK: ICD-10-CM

## 2024-03-01 DIAGNOSIS — N39.3 SUI (STRESS URINARY INCONTINENCE, FEMALE): ICD-10-CM

## 2024-03-01 DIAGNOSIS — F33.42 RECURRENT MAJOR DEPRESSIVE DISORDER, IN FULL REMISSION (HCC): ICD-10-CM

## 2024-03-01 PROCEDURE — 99214 OFFICE O/P EST MOD 30 MIN: CPT | Performed by: FAMILY MEDICINE

## 2024-03-01 RX ORDER — SERTRALINE HYDROCHLORIDE 100 MG/1
TABLET, FILM COATED ORAL
Qty: 180 TABLET | Refills: 3 | Status: SHIPPED | OUTPATIENT
Start: 2024-03-01

## 2024-03-01 RX ORDER — BUPROPION HYDROCHLORIDE 300 MG/1
TABLET ORAL
Qty: 90 TABLET | Refills: 3 | Status: SHIPPED | OUTPATIENT
Start: 2024-03-01

## 2024-03-01 ASSESSMENT — PATIENT HEALTH QUESTIONNAIRE - PHQ9
10. IF YOU CHECKED OFF ANY PROBLEMS, HOW DIFFICULT HAVE THESE PROBLEMS MADE IT FOR YOU TO DO YOUR WORK, TAKE CARE OF THINGS AT HOME, OR GET ALONG WITH OTHER PEOPLE: 0
1. LITTLE INTEREST OR PLEASURE IN DOING THINGS: NOT AT ALL
7. TROUBLE CONCENTRATING ON THINGS, SUCH AS READING THE NEWSPAPER OR WATCHING TELEVISION: 0
SUM OF ALL RESPONSES TO PHQ QUESTIONS 1-9: 2
3. TROUBLE FALLING OR STAYING ASLEEP: NOT AT ALL
5. POOR APPETITE OR OVEREATING: NOT AT ALL
10. IF YOU CHECKED OFF ANY PROBLEMS, HOW DIFFICULT HAVE THESE PROBLEMS MADE IT FOR YOU TO DO YOUR WORK, TAKE CARE OF THINGS AT HOME, OR GET ALONG WITH OTHER PEOPLE: NOT DIFFICULT AT ALL
3. TROUBLE FALLING OR STAYING ASLEEP: 0
SUM OF ALL RESPONSES TO PHQ QUESTIONS 1-9: 2
8. MOVING OR SPEAKING SO SLOWLY THAT OTHER PEOPLE COULD HAVE NOTICED. OR THE OPPOSITE - BEING SO FIDGETY OR RESTLESS THAT YOU HAVE BEEN MOVING AROUND A LOT MORE THAN USUAL: NOT AT ALL
6. FEELING BAD ABOUT YOURSELF - OR THAT YOU ARE A FAILURE OR HAVE LET YOURSELF OR YOUR FAMILY DOWN: 0
4. FEELING TIRED OR HAVING LITTLE ENERGY: SEVERAL DAYS
9. THOUGHTS THAT YOU WOULD BE BETTER OFF DEAD, OR OF HURTING YOURSELF: 0
2. FEELING DOWN, DEPRESSED OR HOPELESS: SEVERAL DAYS
7. TROUBLE CONCENTRATING ON THINGS, SUCH AS READING THE NEWSPAPER OR WATCHING TELEVISION: NOT AT ALL
SUM OF ALL RESPONSES TO PHQ9 QUESTIONS 1 & 2: 1
SUM OF ALL RESPONSES TO PHQ QUESTIONS 1-9: 2
8. MOVING OR SPEAKING SO SLOWLY THAT OTHER PEOPLE COULD HAVE NOTICED. OR THE OPPOSITE, BEING SO FIGETY OR RESTLESS THAT YOU HAVE BEEN MOVING AROUND A LOT MORE THAN USUAL: 0
9. THOUGHTS THAT YOU WOULD BE BETTER OFF DEAD, OR OF HURTING YOURSELF: NOT AT ALL
6. FEELING BAD ABOUT YOURSELF - OR THAT YOU ARE A FAILURE OR HAVE LET YOURSELF OR YOUR FAMILY DOWN: NOT AT ALL
5. POOR APPETITE OR OVEREATING: 0
2. FEELING DOWN, DEPRESSED OR HOPELESS: 1
4. FEELING TIRED OR HAVING LITTLE ENERGY: 1
1. LITTLE INTEREST OR PLEASURE IN DOING THINGS: 0

## 2024-03-01 NOTE — PROGRESS NOTES
[facility] [address] [address]  Phone: [phone] Fax [phone]  Georgia Monge MD  3/1/2024           Ms. Graham  is a 48 y.o.  year old  female patient who comes in for follow up on depression.  She is doing much better on the Wellbutrin indicated for primary milligrams nightly.  100 mg.  She has not had any side effects and it has really helped her be and feel better.  She has had longstanding depression and has been on something for about the past 8 years.  No thoughts of suicide.          3/1/2024     9:27 AM   PHQ-9    Little interest or pleasure in doing things 0   Feeling down, depressed, or hopeless 1   Trouble falling or staying asleep, or sleeping too much 0   Feeling tired or having little energy 1   Poor appetite or overeating 0   Feeling bad about yourself - or that you are a failure or have let yourself or your family down 0   Trouble concentrating on things, such as reading the newspaper or watching television 0   Moving or speaking so slowly that other people could have noticed. Or the opposite - being so fidgety or restless that you have been moving around a lot more than usual 0   Thoughts that you would be better off dead, or of hurting yourself in some way 0   PHQ-2 Score 1   PHQ-9 Total Score 2   If you checked off any problems, how difficult have these problems made it for you to do your work, take care of things at home, or get along with other people? 0      She is also noticed some urinary incontinence particularly when she coughs or sneezes sometimes she will leak a little bit.  She is wondering what she can do for that.          Ms. Graham  has  has a past medical history of Allergic rhinitis due to pollen, Arthritis, Cancer (HCC), DDD (degenerative disc disease), lumbar, Depressed, Exercise-induced asthma, History of kidney donation, Hypothyroidism due to acquired atrophy of thyroid, and Thyroid disease.    Ms. Graham  has  has a

## 2024-03-03 DIAGNOSIS — E03.9 ACQUIRED HYPOTHYROIDISM: ICD-10-CM

## 2024-03-04 RX ORDER — LEVOTHYROXINE SODIUM 137 UG/1
TABLET ORAL
Qty: 90 TABLET | Refills: 1 | Status: SHIPPED | OUTPATIENT
Start: 2024-03-04

## 2024-06-21 ENCOUNTER — PATIENT MESSAGE (OUTPATIENT)
Dept: FAMILY MEDICINE CLINIC | Facility: CLINIC | Age: 49
End: 2024-06-21

## 2024-06-21 DIAGNOSIS — E66.09 CLASS 1 OBESITY DUE TO EXCESS CALORIES WITHOUT SERIOUS COMORBIDITY WITH BODY MASS INDEX (BMI) OF 32.0 TO 32.9 IN ADULT: ICD-10-CM

## 2024-06-21 NOTE — TELEPHONE ENCOUNTER
From: Arielle Graham  To: Dr. Georgia Monge  Sent: 6/21/2024 1:02 PM EDT  Subject: Ozempic    Hi Dr. Monge,     Happy Friday!    Can I get another prescription for Ozempic please? 4mg? I want to send to the [place] pharmacy I used before. I'll need a paper prescription to scan and upload to their website. I have made 2 pens last for 4 months now, with just doing low maintenance dosages. Thanks so so much!    - Arielle Graham

## 2024-07-16 ENCOUNTER — CLINICAL DOCUMENTATION (OUTPATIENT)
Dept: FAMILY MEDICINE CLINIC | Facility: CLINIC | Age: 49
End: 2024-07-16

## 2024-07-16 ENCOUNTER — OFFICE VISIT (OUTPATIENT)
Dept: FAMILY MEDICINE CLINIC | Facility: CLINIC | Age: 49
End: 2024-07-16
Payer: COMMERCIAL

## 2024-07-16 VITALS
HEART RATE: 89 BPM | SYSTOLIC BLOOD PRESSURE: 105 MMHG | DIASTOLIC BLOOD PRESSURE: 56 MMHG | TEMPERATURE: 97.5 F | OXYGEN SATURATION: 98 % | HEIGHT: 67 IN | WEIGHT: 179 LBS | BODY MASS INDEX: 28.09 KG/M2 | RESPIRATION RATE: 16 BRPM

## 2024-07-16 DIAGNOSIS — Z12.31 ENCOUNTER FOR SCREENING MAMMOGRAM FOR MALIGNANT NEOPLASM OF BREAST: ICD-10-CM

## 2024-07-16 DIAGNOSIS — M54.50 CHRONIC BILATERAL LOW BACK PAIN WITHOUT SCIATICA: ICD-10-CM

## 2024-07-16 DIAGNOSIS — Z13.0 SCREENING, ANEMIA, DEFICIENCY, IRON: ICD-10-CM

## 2024-07-16 DIAGNOSIS — E03.9 ACQUIRED HYPOTHYROIDISM: ICD-10-CM

## 2024-07-16 DIAGNOSIS — Z13.6 SCREENING FOR CARDIOVASCULAR CONDITION: ICD-10-CM

## 2024-07-16 DIAGNOSIS — G89.29 CHRONIC BILATERAL LOW BACK PAIN WITHOUT SCIATICA: ICD-10-CM

## 2024-07-16 DIAGNOSIS — Z13.1 SCREENING FOR DIABETES MELLITUS: ICD-10-CM

## 2024-07-16 DIAGNOSIS — M79.604 RIGHT LEG PAIN: ICD-10-CM

## 2024-07-16 DIAGNOSIS — M51.36 DDD (DEGENERATIVE DISC DISEASE), LUMBAR: ICD-10-CM

## 2024-07-16 DIAGNOSIS — G43.009 MIGRAINE WITHOUT AURA AND WITHOUT STATUS MIGRAINOSUS, NOT INTRACTABLE: ICD-10-CM

## 2024-07-16 DIAGNOSIS — Z00.00 ROUTINE GENERAL MEDICAL EXAMINATION AT A HEALTH CARE FACILITY: Primary | ICD-10-CM

## 2024-07-16 LAB
ALBUMIN SERPL-MCNC: 3.7 G/DL (ref 3.5–5)
ALBUMIN/GLOB SERPL: 1.3 (ref 1–1.9)
ALP SERPL-CCNC: 77 U/L (ref 35–104)
ALT SERPL-CCNC: 22 U/L (ref 12–65)
ANION GAP SERPL CALC-SCNC: 8 MMOL/L (ref 9–18)
AST SERPL-CCNC: 21 U/L (ref 15–37)
BASOPHILS # BLD: 0 K/UL (ref 0–0.2)
BASOPHILS NFR BLD: 0 % (ref 0–2)
BILIRUB SERPL-MCNC: <0.2 MG/DL (ref 0–1.2)
BUN SERPL-MCNC: 9 MG/DL (ref 6–23)
CALCIUM SERPL-MCNC: 9.1 MG/DL (ref 8.8–10.2)
CHLORIDE SERPL-SCNC: 107 MMOL/L (ref 98–107)
CHOLEST SERPL-MCNC: 188 MG/DL (ref 0–200)
CO2 SERPL-SCNC: 26 MMOL/L (ref 20–28)
CREAT SERPL-MCNC: 0.97 MG/DL (ref 0.6–1.1)
DIFFERENTIAL METHOD BLD: ABNORMAL
EOSINOPHIL # BLD: 0 K/UL (ref 0–0.8)
EOSINOPHIL NFR BLD: 0 % (ref 0.5–7.8)
ERYTHROCYTE [DISTWIDTH] IN BLOOD BY AUTOMATED COUNT: 12.8 % (ref 11.9–14.6)
GLOBULIN SER CALC-MCNC: 3 G/DL (ref 2.3–3.5)
GLUCOSE SERPL-MCNC: 90 MG/DL (ref 70–99)
HCT VFR BLD AUTO: 43.4 % (ref 35.8–46.3)
HDLC SERPL-MCNC: 44 MG/DL (ref 40–60)
HDLC SERPL: 4.2 (ref 0–5)
HGB BLD-MCNC: 14.2 G/DL (ref 11.7–15.4)
IMM GRANULOCYTES # BLD AUTO: 0 K/UL (ref 0–0.5)
IMM GRANULOCYTES NFR BLD AUTO: 0 % (ref 0–5)
LDLC SERPL CALC-MCNC: 112 MG/DL (ref 0–100)
LYMPHOCYTES # BLD: 1.5 K/UL (ref 0.5–4.6)
LYMPHOCYTES NFR BLD: 30 % (ref 13–44)
MCH RBC QN AUTO: 31.2 PG (ref 26.1–32.9)
MCHC RBC AUTO-ENTMCNC: 32.7 G/DL (ref 31.4–35)
MCV RBC AUTO: 95.4 FL (ref 82–102)
MONOCYTES # BLD: 0.5 K/UL (ref 0.1–1.3)
MONOCYTES NFR BLD: 9 % (ref 4–12)
NEUTS SEG # BLD: 3.1 K/UL (ref 1.7–8.2)
NEUTS SEG NFR BLD: 61 % (ref 43–78)
NRBC # BLD: 0 K/UL (ref 0–0.2)
PLATELET # BLD AUTO: 194 K/UL (ref 150–450)
PMV BLD AUTO: 12.8 FL (ref 9.4–12.3)
POTASSIUM SERPL-SCNC: 4.4 MMOL/L (ref 3.5–5.1)
PROT SERPL-MCNC: 6.7 G/DL (ref 6.3–8.2)
RBC # BLD AUTO: 4.55 M/UL (ref 4.05–5.2)
SODIUM SERPL-SCNC: 141 MMOL/L (ref 136–145)
TRIGL SERPL-MCNC: 158 MG/DL (ref 0–150)
TSH, 3RD GENERATION: 3.66 UIU/ML (ref 0.27–4.2)
VLDLC SERPL CALC-MCNC: 32 MG/DL (ref 6–23)
WBC # BLD AUTO: 5.1 K/UL (ref 4.3–11.1)

## 2024-07-16 PROCEDURE — 99396 PREV VISIT EST AGE 40-64: CPT | Performed by: FAMILY MEDICINE

## 2024-07-16 PROCEDURE — 99214 OFFICE O/P EST MOD 30 MIN: CPT | Performed by: FAMILY MEDICINE

## 2024-07-16 RX ORDER — GABAPENTIN 300 MG/1
300 CAPSULE ORAL 2 TIMES DAILY
Qty: 180 CAPSULE | Refills: 3 | Status: SHIPPED | OUTPATIENT
Start: 2024-07-16 | End: 2025-07-16

## 2024-07-16 SDOH — ECONOMIC STABILITY: FOOD INSECURITY: WITHIN THE PAST 12 MONTHS, YOU WORRIED THAT YOUR FOOD WOULD RUN OUT BEFORE YOU GOT MONEY TO BUY MORE.: NEVER TRUE

## 2024-07-16 SDOH — ECONOMIC STABILITY: HOUSING INSECURITY
IN THE LAST 12 MONTHS, WAS THERE A TIME WHEN YOU DID NOT HAVE A STEADY PLACE TO SLEEP OR SLEPT IN A SHELTER (INCLUDING NOW)?: NO

## 2024-07-16 SDOH — ECONOMIC STABILITY: FOOD INSECURITY: WITHIN THE PAST 12 MONTHS, THE FOOD YOU BOUGHT JUST DIDN'T LAST AND YOU DIDN'T HAVE MONEY TO GET MORE.: NEVER TRUE

## 2024-07-16 SDOH — ECONOMIC STABILITY: INCOME INSECURITY: HOW HARD IS IT FOR YOU TO PAY FOR THE VERY BASICS LIKE FOOD, HOUSING, MEDICAL CARE, AND HEATING?: NOT HARD AT ALL

## 2024-07-16 NOTE — PROGRESS NOTES
min   Stress: Not on file   Social Connections: Not on file   Intimate Partner Violence: Not At Risk (2/22/2022)    Humiliation, Afraid, Rape, and Kick questionnaire     Fear of Current or Ex-Partner: No     Emotionally Abused: No     Physically Abused: No     Sexually Abused: No   Housing Stability: Unknown (7/16/2024)    Housing Stability Vital Sign     Unable to Pay for Housing in the Last Year: Not on file     Number of Places Lived in the Last Year: Not on file     Unstable Housing in the Last Year: No         Ms. Graham  has the following allergies:   Allergies   Allergen Reactions    Flurbiprofen Other (See Comments)     donated kidney    Red Dye Hives     #40       Wt 81.2 kg (179 lb)   BMI 28.04 kg/m²     General: alert, oriented x 3, pleasant.  HEENT: Normocephalic, atraumatic, pupils equal and reactive to light. Tympanic membranes intact without erythema or edema. Oropharynx clear.   Neck: Supple, no masses or thyromegaly or lymphadenopathy.  Lungs: clear to auscultation bilaterally without wheezing or rhonchi.  CV: regular rate and rhythm, without murmurs, rubs, or gallops.  Abdomen: soft, nontender, nondistended, no masses. No hepatosplenomegaly.   Back: She is tender in the right and left lower lumbar regions.  Negative straight leg raise.  She does have a tightness sensation in her low back with flexion at the hip as well as with internal and external rotation of the right hip.  The left hip does not have any abnormalities.  DTRs are 2+ both lower extremities.    Ext: No lower extremity edema.   Breasts: Deferred.  Pelvic: Deferred.  Skin: no lesions.      Arielle was seen today for annual exam.    Diagnoses and all orders for this visit:    Encounter for screening mammogram for malignant neoplasm of breast    Like her to try ibuprofen this next week to help see if it will calm her back down.  She has physical therapy exercises at home that she will do.  Increase gabapentin to 300 mg twice a day.

## 2024-07-16 NOTE — PROGRESS NOTES
Faxed order for mammogram to [facility] scheduling for patient to be scheduled @ [facility]. Confirmation received.   Yes

## 2024-07-17 NOTE — RESULT ENCOUNTER NOTE
Let patient know labs normal other than a slight elevation in triglycerides.  Watch sweets in her diet.

## 2024-10-05 DIAGNOSIS — E03.9 ACQUIRED HYPOTHYROIDISM: ICD-10-CM

## 2024-10-05 DIAGNOSIS — B00.1 FEVER BLISTER: ICD-10-CM

## 2024-10-07 RX ORDER — LEVOTHYROXINE SODIUM 137 UG/1
TABLET ORAL
Qty: 90 TABLET | Refills: 0 | Status: SHIPPED | OUTPATIENT
Start: 2024-10-07

## 2024-10-07 RX ORDER — ACYCLOVIR 800 MG/1
TABLET ORAL
Qty: 20 TABLET | Refills: 0 | Status: SHIPPED | OUTPATIENT
Start: 2024-10-07

## 2025-01-12 DIAGNOSIS — E03.9 ACQUIRED HYPOTHYROIDISM: ICD-10-CM

## 2025-01-13 RX ORDER — LEVOTHYROXINE SODIUM 137 UG/1
TABLET ORAL
Qty: 90 TABLET | Refills: 0 | Status: SHIPPED | OUTPATIENT
Start: 2025-01-13

## 2025-03-29 DIAGNOSIS — F33.42 RECURRENT MAJOR DEPRESSIVE DISORDER, IN FULL REMISSION: ICD-10-CM

## 2025-03-29 DIAGNOSIS — F41.0 PANIC ATTACK: ICD-10-CM

## 2025-03-31 RX ORDER — SERTRALINE HYDROCHLORIDE 100 MG/1
200 TABLET, FILM COATED ORAL DAILY
Qty: 180 TABLET | Refills: 0 | Status: SHIPPED | OUTPATIENT
Start: 2025-03-31

## 2025-03-31 RX ORDER — BUPROPION HYDROCHLORIDE 300 MG/1
300 TABLET ORAL EVERY MORNING
Qty: 90 TABLET | Refills: 0 | Status: SHIPPED | OUTPATIENT
Start: 2025-03-31

## 2025-04-15 DIAGNOSIS — E03.9 ACQUIRED HYPOTHYROIDISM: ICD-10-CM

## 2025-04-15 RX ORDER — LEVOTHYROXINE SODIUM 137 UG/1
137 TABLET ORAL DAILY
Qty: 90 TABLET | Refills: 0 | Status: SHIPPED | OUTPATIENT
Start: 2025-04-15

## 2025-04-15 NOTE — TELEPHONE ENCOUNTER
4/25/2023  @ 4:49 PM - Rcvd PA renewal request from [facility] (sprdsheet) :        Per Encounter dated 10/18/22, Irma Pate notes that there is a perpetual PA on file for this patient through [facility].   Sent email to [facility] relaying this information and inquire if claim rejected.             Louise CORONA CMA- Prior Auths  Cardiology/Pulmonary Hypertension      Detail Level: Zone Next appt 7/21/25

## 2025-06-09 ENCOUNTER — HOSPITAL ENCOUNTER (OUTPATIENT)
Dept: MAMMOGRAPHY | Age: 50
Discharge: HOME | End: 2025-06-12
Payer: COMMERCIAL

## 2025-06-09 DIAGNOSIS — Z12.31 ENCOUNTER FOR SCREENING MAMMOGRAM FOR MALIGNANT NEOPLASM OF BREAST: ICD-10-CM

## 2025-06-09 PROCEDURE — 77063 BREAST TOMOSYNTHESIS BI: CPT

## 2025-06-11 ENCOUNTER — RESULTS FOLLOW-UP (OUTPATIENT)
Dept: FAMILY MEDICINE CLINIC | Facility: CLINIC | Age: 50
End: 2025-06-11

## 2025-07-13 DIAGNOSIS — E03.9 ACQUIRED HYPOTHYROIDISM: ICD-10-CM

## 2025-07-13 DIAGNOSIS — F41.0 PANIC ATTACK: ICD-10-CM

## 2025-07-13 DIAGNOSIS — F33.42 RECURRENT MAJOR DEPRESSIVE DISORDER, IN FULL REMISSION: ICD-10-CM

## 2025-07-14 DIAGNOSIS — Z00.00 ROUTINE GENERAL MEDICAL EXAMINATION AT A HEALTH CARE FACILITY: Primary | ICD-10-CM

## 2025-07-14 DIAGNOSIS — E03.9 ACQUIRED HYPOTHYROIDISM: ICD-10-CM

## 2025-07-14 RX ORDER — SERTRALINE HYDROCHLORIDE 100 MG/1
200 TABLET, FILM COATED ORAL DAILY
Qty: 180 TABLET | Refills: 0 | Status: SHIPPED | OUTPATIENT
Start: 2025-07-14

## 2025-07-14 RX ORDER — LEVOTHYROXINE SODIUM 137 UG/1
137 TABLET ORAL DAILY
Qty: 90 TABLET | Refills: 0 | Status: SHIPPED | OUTPATIENT
Start: 2025-07-14

## 2025-07-14 RX ORDER — BUPROPION HYDROCHLORIDE 300 MG/1
300 TABLET ORAL EVERY MORNING
Qty: 90 TABLET | Refills: 0 | Status: SHIPPED | OUTPATIENT
Start: 2025-07-14

## 2025-07-17 ENCOUNTER — LAB (OUTPATIENT)
Dept: FAMILY MEDICINE CLINIC | Facility: CLINIC | Age: 50
End: 2025-07-17

## 2025-07-17 DIAGNOSIS — E03.9 ACQUIRED HYPOTHYROIDISM: ICD-10-CM

## 2025-07-17 DIAGNOSIS — Z00.00 ROUTINE GENERAL MEDICAL EXAMINATION AT A HEALTH CARE FACILITY: ICD-10-CM

## 2025-07-17 LAB
ALBUMIN SERPL-MCNC: 3.7 G/DL (ref 3.5–5)
ALBUMIN/GLOB SERPL: 1.2 (ref 1–1.9)
ALP SERPL-CCNC: 80 U/L (ref 35–104)
ALT SERPL-CCNC: 30 U/L (ref 8–45)
ANION GAP SERPL CALC-SCNC: 10 MMOL/L (ref 7–16)
AST SERPL-CCNC: 23 U/L (ref 15–37)
BASOPHILS # BLD: 0 K/UL (ref 0–0.2)
BASOPHILS NFR BLD: 0 % (ref 0–2)
BILIRUB SERPL-MCNC: 0.4 MG/DL (ref 0–1.2)
BUN SERPL-MCNC: 9 MG/DL (ref 6–23)
CALCIUM SERPL-MCNC: 9.2 MG/DL (ref 8.8–10.2)
CHLORIDE SERPL-SCNC: 104 MMOL/L (ref 98–107)
CHOLEST SERPL-MCNC: 168 MG/DL (ref 0–200)
CO2 SERPL-SCNC: 24 MMOL/L (ref 20–29)
CREAT SERPL-MCNC: 0.95 MG/DL (ref 0.6–1.1)
DIFFERENTIAL METHOD BLD: ABNORMAL
EOSINOPHIL # BLD: 0 K/UL (ref 0–0.8)
EOSINOPHIL NFR BLD: 0 % (ref 0.5–7.8)
ERYTHROCYTE [DISTWIDTH] IN BLOOD BY AUTOMATED COUNT: 12.6 % (ref 11.9–14.6)
GLOBULIN SER CALC-MCNC: 3 G/DL (ref 2.3–3.5)
GLUCOSE SERPL-MCNC: 93 MG/DL (ref 70–99)
HCT VFR BLD AUTO: 41.9 % (ref 35.8–46.3)
HDLC SERPL-MCNC: 44 MG/DL (ref 40–60)
HDLC SERPL: 3.8 (ref 0–5)
HGB BLD-MCNC: 14.3 G/DL (ref 11.7–15.4)
IMM GRANULOCYTES # BLD AUTO: 0.02 K/UL (ref 0–0.5)
IMM GRANULOCYTES NFR BLD AUTO: 0.4 % (ref 0–5)
LDLC SERPL CALC-MCNC: 100 MG/DL (ref 0–100)
LYMPHOCYTES # BLD: 1.67 K/UL (ref 0.5–4.6)
LYMPHOCYTES NFR BLD: 31 % (ref 13–44)
MCH RBC QN AUTO: 32 PG (ref 26.1–32.9)
MCHC RBC AUTO-ENTMCNC: 34.1 G/DL (ref 31.4–35)
MCV RBC AUTO: 93.7 FL (ref 82–102)
MONOCYTES # BLD: 0.5 K/UL (ref 0.1–1.3)
MONOCYTES NFR BLD: 9.3 % (ref 4–12)
NEUTS SEG # BLD: 3.19 K/UL (ref 1.7–8.2)
NEUTS SEG NFR BLD: 59.3 % (ref 43–78)
NRBC # BLD: 0 K/UL (ref 0–0.2)
PLATELET # BLD AUTO: 185 K/UL (ref 150–450)
PMV BLD AUTO: 12.4 FL (ref 9.4–12.3)
POTASSIUM SERPL-SCNC: 4.5 MMOL/L (ref 3.5–5.1)
PROT SERPL-MCNC: 6.7 G/DL (ref 6.3–8.2)
RBC # BLD AUTO: 4.47 M/UL (ref 4.05–5.2)
SODIUM SERPL-SCNC: 138 MMOL/L (ref 136–145)
TRIGL SERPL-MCNC: 119 MG/DL (ref 0–150)
TSH, 3RD GENERATION: 3.39 UIU/ML (ref 0.27–4.2)
VLDLC SERPL CALC-MCNC: 24 MG/DL (ref 6–23)
WBC # BLD AUTO: 5.4 K/UL (ref 4.3–11.1)

## 2025-07-21 ENCOUNTER — OFFICE VISIT (OUTPATIENT)
Dept: FAMILY MEDICINE CLINIC | Facility: CLINIC | Age: 50
End: 2025-07-21
Payer: COMMERCIAL

## 2025-07-21 VITALS
BODY MASS INDEX: 28.28 KG/M2 | TEMPERATURE: 97.2 F | HEART RATE: 90 BPM | DIASTOLIC BLOOD PRESSURE: 70 MMHG | HEIGHT: 66 IN | OXYGEN SATURATION: 98 % | SYSTOLIC BLOOD PRESSURE: 121 MMHG | WEIGHT: 176 LBS

## 2025-07-21 DIAGNOSIS — E66.811 CLASS 1 OBESITY DUE TO EXCESS CALORIES WITHOUT SERIOUS COMORBIDITY WITH BODY MASS INDEX (BMI) OF 32.0 TO 32.9 IN ADULT: ICD-10-CM

## 2025-07-21 DIAGNOSIS — F41.0 PANIC ATTACK: ICD-10-CM

## 2025-07-21 DIAGNOSIS — G43.009 MIGRAINE WITHOUT AURA AND WITHOUT STATUS MIGRAINOSUS, NOT INTRACTABLE: ICD-10-CM

## 2025-07-21 DIAGNOSIS — E03.9 ACQUIRED HYPOTHYROIDISM: ICD-10-CM

## 2025-07-21 DIAGNOSIS — F51.01 PRIMARY INSOMNIA: ICD-10-CM

## 2025-07-21 DIAGNOSIS — K43.2 VENTRAL INCISIONAL HERNIA: ICD-10-CM

## 2025-07-21 DIAGNOSIS — E66.3 OVERWEIGHT: ICD-10-CM

## 2025-07-21 DIAGNOSIS — B00.1 FEVER BLISTER: ICD-10-CM

## 2025-07-21 DIAGNOSIS — Z12.31 ENCOUNTER FOR SCREENING MAMMOGRAM FOR MALIGNANT NEOPLASM OF BREAST: ICD-10-CM

## 2025-07-21 DIAGNOSIS — F33.1 MODERATE EPISODE OF RECURRENT MAJOR DEPRESSIVE DISORDER (HCC): ICD-10-CM

## 2025-07-21 DIAGNOSIS — B37.31 VAGINAL YEAST INFECTION: ICD-10-CM

## 2025-07-21 DIAGNOSIS — E66.09 CLASS 1 OBESITY DUE TO EXCESS CALORIES WITHOUT SERIOUS COMORBIDITY WITH BODY MASS INDEX (BMI) OF 32.0 TO 32.9 IN ADULT: ICD-10-CM

## 2025-07-21 DIAGNOSIS — L56.5 DSAP (DISSEMINATED SUPERFICIAL ACTINIC POROKERATOSIS): ICD-10-CM

## 2025-07-21 DIAGNOSIS — J45.20 MILD INTERMITTENT ASTHMA WITHOUT COMPLICATION: ICD-10-CM

## 2025-07-21 DIAGNOSIS — F33.42 RECURRENT MAJOR DEPRESSIVE DISORDER, IN FULL REMISSION: ICD-10-CM

## 2025-07-21 DIAGNOSIS — Z00.00 ROUTINE GENERAL MEDICAL EXAMINATION AT A HEALTH CARE FACILITY: Primary | ICD-10-CM

## 2025-07-21 PROCEDURE — 99214 OFFICE O/P EST MOD 30 MIN: CPT | Performed by: FAMILY MEDICINE

## 2025-07-21 PROCEDURE — 99396 PREV VISIT EST AGE 40-64: CPT | Performed by: FAMILY MEDICINE

## 2025-07-21 RX ORDER — FLUCONAZOLE 150 MG/1
TABLET ORAL
Qty: 1 TABLET | Refills: 0 | Status: SHIPPED | OUTPATIENT
Start: 2025-07-21

## 2025-07-21 RX ORDER — FROVATRIPTAN SUCCINATE 2.5 MG/1
2.5 TABLET, FILM COATED ORAL PRN
Qty: 30 TABLET | Refills: 3 | Status: SHIPPED | OUTPATIENT
Start: 2025-07-21

## 2025-07-21 RX ORDER — SERTRALINE HYDROCHLORIDE 100 MG/1
200 TABLET, FILM COATED ORAL DAILY
Qty: 180 TABLET | Refills: 3 | Status: SHIPPED | OUTPATIENT
Start: 2025-07-21

## 2025-07-21 RX ORDER — LEVOTHYROXINE SODIUM 137 UG/1
137 TABLET ORAL DAILY
Qty: 90 TABLET | Refills: 3 | Status: SHIPPED | OUTPATIENT
Start: 2025-07-21

## 2025-07-21 RX ORDER — HYDROXYZINE HYDROCHLORIDE 25 MG/1
TABLET, FILM COATED ORAL
Qty: 30 TABLET | Refills: 0 | Status: CANCELLED | OUTPATIENT
Start: 2025-07-21

## 2025-07-21 RX ORDER — ACYCLOVIR 800 MG/1
800 TABLET ORAL 2 TIMES DAILY
Qty: 20 TABLET | Refills: 5 | Status: SHIPPED | OUTPATIENT
Start: 2025-07-21 | End: 2026-02-06

## 2025-07-21 RX ORDER — ALBUTEROL SULFATE 90 UG/1
1 INHALANT RESPIRATORY (INHALATION) EVERY 4 HOURS PRN
Qty: 18 G | Status: CANCELLED | OUTPATIENT
Start: 2025-07-21

## 2025-07-21 RX ORDER — BUPROPION HYDROCHLORIDE 300 MG/1
300 TABLET ORAL EVERY MORNING
Qty: 90 TABLET | Refills: 3 | Status: SHIPPED | OUTPATIENT
Start: 2025-07-21

## 2025-07-21 RX ORDER — TRAZODONE HYDROCHLORIDE 150 MG/1
TABLET ORAL
Qty: 90 TABLET | Refills: 3 | Status: SHIPPED | OUTPATIENT
Start: 2025-07-21

## 2025-07-21 RX ORDER — ALBUTEROL SULFATE 90 UG/1
2 INHALANT RESPIRATORY (INHALATION) 4 TIMES DAILY PRN
Qty: 18 G | Refills: 5 | Status: SHIPPED | OUTPATIENT
Start: 2025-07-21

## 2025-07-21 RX ORDER — ACYCLOVIR 800 MG/1
TABLET ORAL
Qty: 20 TABLET | Refills: 0 | Status: CANCELLED | OUTPATIENT
Start: 2025-07-21

## 2025-07-21 RX ORDER — GABAPENTIN 300 MG/1
300 CAPSULE ORAL 2 TIMES DAILY
Qty: 180 CAPSULE | Refills: 3 | Status: SHIPPED | OUTPATIENT
Start: 2025-07-21 | End: 2026-07-21

## 2025-07-21 RX ORDER — HYDROXYZINE HYDROCHLORIDE 25 MG/1
TABLET, FILM COATED ORAL
Qty: 30 TABLET | Refills: 5 | Status: SHIPPED | OUTPATIENT
Start: 2025-07-21

## 2025-07-21 SDOH — ECONOMIC STABILITY: FOOD INSECURITY: WITHIN THE PAST 12 MONTHS, THE FOOD YOU BOUGHT JUST DIDN'T LAST AND YOU DIDN'T HAVE MONEY TO GET MORE.: NEVER TRUE

## 2025-07-21 SDOH — ECONOMIC STABILITY: FOOD INSECURITY: WITHIN THE PAST 12 MONTHS, YOU WORRIED THAT YOUR FOOD WOULD RUN OUT BEFORE YOU GOT MONEY TO BUY MORE.: NEVER TRUE

## 2025-07-21 ASSESSMENT — PATIENT HEALTH QUESTIONNAIRE - PHQ9
2. FEELING DOWN, DEPRESSED OR HOPELESS: NOT AT ALL
1. LITTLE INTEREST OR PLEASURE IN DOING THINGS: NOT AT ALL
SUM OF ALL RESPONSES TO PHQ QUESTIONS 1-9: 0

## 2025-07-21 NOTE — PROGRESS NOTES
[facility] [address] [address]  Phone: [phone] Fax [phone]  Georgia Monge MD          Ms. Graham  is a 49 y.o.  year old  female patient who comes in for complete physical.    Last Colonoscopy: 12/23  Last Mammogram: 6/25    History of Present Illness  The patient presents for a physical exam.    She reports overall good health and occasional use of nonsteroidal anti-inflammatory drugs (NSAIDs) like Advil or Aleve. She has been experiencing stomach issues, including constipation and a sensation of blockage, but these symptoms have since resolved. She underwent a colonoscopy in 12/2023, which revealed a few polyps. She takes fiber gummies and uses MiraLAX as needed.  She has been taking Ozempic for weight loss and constipation may be due to that.    She has a history of hernia and was referred to a surgeon in 2021. She experiences pain when sneezing and notes that her abdomen protrudes when she lies in certain positions.  At the time she saw the surgeon they opted to wait on surgery.  She is considering a follow-up visit with the surgeon but she does not think she needs to go yet.    She maintains a healthy diet, including daily vegetable intake, and engages in regular exercise, although not as much as she would like. She walks and does some weight training.    She is currently on Ozempic 1 mg, which she administers every other week, which she gets from [facility].  She has experienced weight fluctuations, losing at least 30 pounds initially but gaining some weight back after ankle surgery.     She is also on levothyroxine 137 mcg for hypothyroidism, sertraline 100 mg twice daily for depression but she is tapering down off the dose because she is doing well, trazodone 150 mg at bedtime to help her sleep, gabapentin 300 mg twice daily, Frova as needed for migraines, Wellbutrin  mg for depression that is helping, acyclovir as needed for fever blisters,

## (undated) DEVICE — [SMALL-JOINT FULL RADIUS CUTTER, ARTHROSCOPIC SHAVER BLADE,  DO NOT RESTERILIZE,  DO NOT USE IF PACKAGE IS DAMAGED,  KEEP DRY,  KEEP AWAY FROM SUNLIGHT]: Brand: FORMULA

## (undated) DEVICE — C-ARM: Brand: UNBRANDED

## (undated) DEVICE — GLOVE ORANGE PI 7 1/2   MSG9075

## (undated) DEVICE — GLOVE SURG SZ 8 L12IN FNGR THK79MIL GRN LTX FREE

## (undated) DEVICE — NEEDLE SPNL L3.5IN PNK HUB S STL REG WALL FIT STYL W/ QNCKE

## (undated) DEVICE — SOLUTION IRRIG 3000ML 0.9% SOD CHL USP UROMATIC PLAS CONT

## (undated) DEVICE — STERILE PVP: Brand: MEDLINE INDUSTRIES, INC.

## (undated) DEVICE — GOWN,SIRUS,NONRNF,SETINSLV,2XL,18/CS: Brand: MEDLINE

## (undated) DEVICE — DISPOSABLE KIT FOR 1.8 MM Q-FIX                                    IMPLANT, INCLUDES DRILL, DRILL GUIDE                                    AND OBTURATOR: Brand: Q-FIX

## (undated) DEVICE — FOOT DR TOLLISON & WOMACK: Brand: MEDLINE INDUSTRIES, INC.

## (undated) DEVICE — GLOVE SURG SZ 85 L12IN FNGR ORTHO 126MIL CRM LTX FREE

## (undated) DEVICE — INTENDED FOR TISSUE SEPARATION, AND OTHER PROCEDURES THAT REQUIRE A SHARP SURGICAL BLADE TO PUNCTURE OR CUT.: Brand: BARD-PARKER ® STAINLESS STEEL BLADES

## (undated) DEVICE — TUBING PMP L16FT MAIN DISP FOR AR-6400 AR-6475

## (undated) DEVICE — INTENT TO BE USED WITH SUTURE MATERIAL FOR TISSUE CLOSURE: Brand: RICHARD-ALLAN®  NEEDLE 1/2 CIRCLE REVERSE CUTTING

## (undated) DEVICE — DISSECTOR ENDOSCP L7MM DIA3MM FOR RESECT SM JT COOLCUT

## (undated) DEVICE — GLOVE ORTHO 8   MSG9480